# Patient Record
Sex: FEMALE | Race: BLACK OR AFRICAN AMERICAN | NOT HISPANIC OR LATINO | Employment: OTHER | URBAN - METROPOLITAN AREA
[De-identification: names, ages, dates, MRNs, and addresses within clinical notes are randomized per-mention and may not be internally consistent; named-entity substitution may affect disease eponyms.]

---

## 2023-05-22 DIAGNOSIS — E78.00 HIGH CHOLESTEROL: ICD-10-CM

## 2023-05-22 RX ORDER — ATORVASTATIN CALCIUM 40 MG/1
40 TABLET, FILM COATED ORAL NIGHTLY
Qty: 90 TABLET | Refills: 0 | Status: SHIPPED | OUTPATIENT
Start: 2023-05-22 | End: 2023-07-17

## 2023-05-22 RX ORDER — ATORVASTATIN CALCIUM 40 MG/1
40 TABLET, FILM COATED ORAL NIGHTLY
COMMUNITY
Start: 2021-01-08 | End: 2023-05-22 | Stop reason: SDUPTHER

## 2023-07-16 DIAGNOSIS — E78.00 HIGH CHOLESTEROL: ICD-10-CM

## 2023-07-17 RX ORDER — ATORVASTATIN CALCIUM 40 MG/1
TABLET, FILM COATED ORAL
Qty: 90 TABLET | Refills: 3 | Status: SHIPPED | OUTPATIENT
Start: 2023-07-17 | End: 2023-10-09 | Stop reason: SDUPTHER

## 2023-10-03 DIAGNOSIS — G35 MULTIPLE SCLEROSIS (MULTI): Primary | ICD-10-CM

## 2023-10-06 ENCOUNTER — SPECIALTY PHARMACY (OUTPATIENT)
Dept: PHARMACY | Facility: CLINIC | Age: 58
End: 2023-10-06

## 2023-10-06 RX ORDER — SIPONIMOD 2 MG/1
2 TABLET, FILM COATED ORAL DAILY
Qty: 90 TABLET | Refills: 1 | Status: SHIPPED | OUTPATIENT
Start: 2023-10-06 | End: 2024-04-03

## 2023-10-06 RX ORDER — SIPONIMOD 0.25 MG/1
TABLET, FILM COATED ORAL
Qty: 12 TABLET | Refills: 0 | Status: SHIPPED | OUTPATIENT
Start: 2023-10-06

## 2023-10-09 ENCOUNTER — LAB (OUTPATIENT)
Dept: LAB | Facility: LAB | Age: 58
End: 2023-10-09
Payer: COMMERCIAL

## 2023-10-09 ENCOUNTER — HOSPITAL ENCOUNTER (OUTPATIENT)
Dept: RADIOLOGY | Facility: HOSPITAL | Age: 58
Discharge: HOME | End: 2023-10-09
Payer: COMMERCIAL

## 2023-10-09 ENCOUNTER — OFFICE VISIT (OUTPATIENT)
Dept: PRIMARY CARE | Facility: CLINIC | Age: 58
End: 2023-10-09
Payer: COMMERCIAL

## 2023-10-09 VITALS
BODY MASS INDEX: 24.73 KG/M2 | HEART RATE: 68 BPM | DIASTOLIC BLOOD PRESSURE: 86 MMHG | SYSTOLIC BLOOD PRESSURE: 146 MMHG | HEIGHT: 61 IN | OXYGEN SATURATION: 99 % | WEIGHT: 131 LBS | RESPIRATION RATE: 21 BRPM

## 2023-10-09 DIAGNOSIS — I10 HYPERTENSION, UNSPECIFIED TYPE: ICD-10-CM

## 2023-10-09 DIAGNOSIS — G89.29 CHRONIC BILATERAL LOW BACK PAIN WITH BILATERAL SCIATICA: ICD-10-CM

## 2023-10-09 DIAGNOSIS — M79.89 SWELLING OF FINGER: ICD-10-CM

## 2023-10-09 DIAGNOSIS — Z28.21 INFLUENZA VACCINATION DECLINED BY PATIENT: ICD-10-CM

## 2023-10-09 DIAGNOSIS — E78.00 HIGH CHOLESTEROL: ICD-10-CM

## 2023-10-09 DIAGNOSIS — E55.9 VITAMIN D DEFICIENCY: ICD-10-CM

## 2023-10-09 DIAGNOSIS — Z91.81 AT HIGH RISK FOR FALLS: ICD-10-CM

## 2023-10-09 DIAGNOSIS — Z53.20 MAMMOGRAM DECLINED: ICD-10-CM
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
## 2023-10-09 DIAGNOSIS — M79.646 PAIN OF FINGER, UNSPECIFIED LATERALITY: ICD-10-CM

## 2023-10-09 DIAGNOSIS — M54.41 CHRONIC BILATERAL LOW BACK PAIN WITH BILATERAL SCIATICA: ICD-10-CM

## 2023-10-09 DIAGNOSIS — Z00.00 ROUTINE GENERAL MEDICAL EXAMINATION AT A HEALTH CARE FACILITY: Primary | ICD-10-CM

## 2023-10-09 DIAGNOSIS — M19.90 ARTHRITIS: ICD-10-CM

## 2023-10-09 DIAGNOSIS — E53.8 LOW SERUM VITAMIN B12: ICD-10-CM

## 2023-10-09 DIAGNOSIS — E78.2 MIXED HYPERLIPIDEMIA: ICD-10-CM

## 2023-10-09 DIAGNOSIS — R23.8 DRY SCALP: ICD-10-CM

## 2023-10-09 DIAGNOSIS — M54.42 CHRONIC BILATERAL LOW BACK PAIN WITH BILATERAL SCIATICA: ICD-10-CM

## 2023-10-09 DIAGNOSIS — G35 MULTIPLE SCLEROSIS (MULTI): ICD-10-CM
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
## 2023-10-09 PROBLEM — M54.9 BACK PAIN: Status: ACTIVE | Noted: 2023-10-09

## 2023-10-09 PROBLEM — M25.511 CHRONIC PAIN OF BOTH SHOULDERS: Status: ACTIVE | Noted: 2017-11-14

## 2023-10-09 PROBLEM — M25.512 CHRONIC PAIN OF BOTH SHOULDERS: Status: ACTIVE | Noted: 2017-11-14

## 2023-10-09 PROBLEM — M54.50 ACUTE BILATERAL LOW BACK PAIN WITHOUT SCIATICA: Status: ACTIVE | Noted: 2017-05-15

## 2023-10-09 PROBLEM — R26.89 IMPAIRED GAIT AND MOBILITY: Status: ACTIVE | Noted: 2023-10-09

## 2023-10-09 PROBLEM — E78.5 HYPERLIPIDEMIA: Status: ACTIVE | Noted: 2023-10-09

## 2023-10-09 PROBLEM — C20 RECTAL CANCER (MULTI): Status: ACTIVE | Noted: 2018-05-30

## 2023-10-09 PROBLEM — R79.89 LOW VITAMIN D LEVEL: Status: ACTIVE | Noted: 2023-10-09

## 2023-10-09 PROBLEM — R25.2 SPASTICITY: Status: ACTIVE | Noted: 2023-10-09

## 2023-10-09 PROBLEM — G82.20 PARAPARESIS (MULTI): Status: ACTIVE | Noted: 2023-10-09

## 2023-10-09 PROBLEM — M54.40 CHRONIC BILATERAL LOW BACK PAIN WITH SCIATICA: Status: ACTIVE | Noted: 2017-11-14

## 2023-10-09 PROBLEM — M54.2 NECK PAIN, CHRONIC: Status: ACTIVE | Noted: 2017-11-14

## 2023-10-09 LAB — VIT B12 SERPL-MCNC: 561 PG/ML (ref 211–911)

## 2023-10-09 PROCEDURE — 3079F DIAST BP 80-89 MM HG: CPT

## 2023-10-09 PROCEDURE — 3077F SYST BP >= 140 MM HG: CPT

## 2023-10-09 PROCEDURE — 73130 X-RAY EXAM OF HAND: CPT | Mod: 50

## 2023-10-09 PROCEDURE — 73130 X-RAY EXAM OF HAND: CPT | Mod: BILATERAL PROCEDURE | Performed by: RADIOLOGY

## 2023-10-09 PROCEDURE — 82607 VITAMIN B-12: CPT

## 2023-10-09 PROCEDURE — 99214 OFFICE O/P EST MOD 30 MIN: CPT

## 2023-10-09 PROCEDURE — 36415 COLL VENOUS BLD VENIPUNCTURE: CPT

## 2023-10-09 PROCEDURE — 1036F TOBACCO NON-USER: CPT

## 2023-10-09 RX ORDER — ATORVASTATIN CALCIUM 40 MG/1
40 TABLET, FILM COATED ORAL NIGHTLY
Qty: 90 TABLET | Refills: 3 | Status: SHIPPED | OUTPATIENT
Start: 2023-10-09 | End: 2024-02-05 | Stop reason: SDUPTHER

## 2023-10-09 RX ORDER — AMANTADINE HYDROCHLORIDE 100 MG/1
100 TABLET ORAL DAILY
Qty: 30 TABLET | Refills: 0 | OUTPATIENT
Start: 2023-10-09 | End: 2023-11-08

## 2023-10-09 RX ORDER — KETOCONAZOLE 20 MG/ML
SHAMPOO, SUSPENSION TOPICAL 2 TIMES WEEKLY
Qty: 120 ML | Refills: 0 | Status: SHIPPED | OUTPATIENT
Start: 2023-10-09 | End: 2024-01-12

## 2023-10-09 RX ORDER — ERGOCALCIFEROL 1.25 MG/1
50000 CAPSULE ORAL
Qty: 12 CAPSULE | Refills: 0 | Status: SHIPPED | OUTPATIENT
Start: 2023-10-09 | End: 2023-12-12

## 2023-10-09 RX ORDER — AMLODIPINE BESYLATE 5 MG/1
5 TABLET ORAL DAILY
COMMUNITY
Start: 2019-03-27 | End: 2023-10-09 | Stop reason: SDUPTHER

## 2023-10-09 RX ORDER — ALBUTEROL SULFATE 90 UG/1
2 AEROSOL, METERED RESPIRATORY (INHALATION) EVERY 4 HOURS PRN
COMMUNITY
Start: 2018-08-30 | End: 2023-10-09 | Stop reason: SDUPTHER

## 2023-10-09 RX ORDER — MULTIVITAMIN
1 TABLET ORAL
COMMUNITY

## 2023-10-09 RX ORDER — AMLODIPINE BESYLATE 2.5 MG/1
2.5 TABLET ORAL DAILY
Qty: 90 TABLET | Refills: 3 | Status: SHIPPED | OUTPATIENT
Start: 2023-10-09 | End: 2024-01-18 | Stop reason: SDUPTHER

## 2023-10-09 RX ORDER — ALBUTEROL SULFATE 90 UG/1
2 AEROSOL, METERED RESPIRATORY (INHALATION) EVERY 4 HOURS PRN
Qty: 18 G | Refills: 2 | Status: SHIPPED | OUTPATIENT
Start: 2023-10-09 | End: 2023-12-22

## 2023-10-09 RX ORDER — ACETAMINOPHEN 325 MG/1
650 TABLET ORAL EVERY 6 HOURS PRN
COMMUNITY

## 2023-10-09 RX ORDER — AMANTADINE HYDROCHLORIDE 100 MG/1
TABLET ORAL
COMMUNITY
Start: 2020-01-30 | End: 2023-10-09 | Stop reason: SDUPTHER

## 2023-10-09 RX ORDER — AMANTADINE HYDROCHLORIDE 100 MG/1
100 TABLET ORAL DAILY
Qty: 30 TABLET | Refills: 0 | Status: SHIPPED | OUTPATIENT
Start: 2023-10-09 | End: 2023-10-09 | Stop reason: ENTERED-IN-ERROR

## 2023-10-09 RX ORDER — BACLOFEN 10 MG/1
10 TABLET ORAL 3 TIMES DAILY
Qty: 90 TABLET | Refills: 3 | Status: SHIPPED | OUTPATIENT
Start: 2023-10-09

## 2023-10-09 RX ORDER — BACLOFEN 10 MG/1
10 TABLET ORAL 3 TIMES DAILY
COMMUNITY
Start: 2019-03-19 | End: 2023-10-09 | Stop reason: SDUPTHER

## 2023-10-09 ASSESSMENT — ENCOUNTER SYMPTOMS
BLOOD IN STOOL: 0
APNEA: 0
CARDIOVASCULAR NEGATIVE: 1
LIGHT-HEADEDNESS: 0
RESPIRATORY NEGATIVE: 1
DIAPHORESIS: 0
DIARRHEA: 0
VOMITING: 0
HEADACHES: 0
GASTROINTESTINAL NEGATIVE: 1
RHINORRHEA: 0
AGITATION: 0
DYSPHORIC MOOD: 0
OCCASIONAL FEELINGS OF UNSTEADINESS: 0
BACK PAIN: 1
CHILLS: 0
LOSS OF SENSATION IN FEET: 0
COLOR CHANGE: 0
EYES NEGATIVE: 1
ARTHRALGIAS: 1
CHEST TIGHTNESS: 0
ACTIVITY CHANGE: 0
NERVOUS/ANXIOUS: 0
FREQUENCY: 0
SLEEP DISTURBANCE: 0
RECTAL PAIN: 0
HYPERACTIVE: 0
POLYPHAGIA: 0
SPEECH DIFFICULTY: 0
PALPITATIONS: 0
DIFFICULTY URINATING: 0
FEVER: 0
SORE THROAT: 0
DYSURIA: 0
SINUS PRESSURE: 0
NECK STIFFNESS: 0
PSYCHIATRIC NEGATIVE: 1
BRUISES/BLEEDS EASILY: 0
PHOTOPHOBIA: 0
STRIDOR: 0
UNEXPECTED WEIGHT CHANGE: 0
APPETITE CHANGE: 0
HEMATURIA: 0
FLANK PAIN: 0
CONSTIPATION: 0
HALLUCINATIONS: 0
ABDOMINAL PAIN: 0
CONFUSION: 0
NAUSEA: 0
ROS SKIN COMMENTS: DRY SCALP
SEIZURES: 0
SHORTNESS OF BREATH: 0
TREMORS: 0
FATIGUE: 0
MYALGIAS: 1
COUGH: 0
WHEEZING: 0
VOICE CHANGE: 0
ABDOMINAL DISTENTION: 0
ANAL BLEEDING: 0
NECK PAIN: 0
EYE DISCHARGE: 0
ENDOCRINE NEGATIVE: 1
DEPRESSION: 0
TROUBLE SWALLOWING: 0
JOINT SWELLING: 1
HEMATOLOGIC/LYMPHATIC NEGATIVE: 1
CONSTITUTIONAL NEGATIVE: 1
DIZZINESS: 0
POLYDIPSIA: 0

## 2023-10-09 ASSESSMENT — PATIENT HEALTH QUESTIONNAIRE - PHQ9
SUM OF ALL RESPONSES TO PHQ9 QUESTIONS 1 AND 2: 0
1. LITTLE INTEREST OR PLEASURE IN DOING THINGS: NOT AT ALL
2. FEELING DOWN, DEPRESSED OR HOPELESS: NOT AT ALL

## 2023-10-09 ASSESSMENT — PAIN SCALES - GENERAL: PAINLEVEL: 10-WORST PAIN EVER

## 2023-10-09 NOTE — TELEPHONE ENCOUNTER
Filled the albuterol. Click on the amantadine (Symmetrel) 100 mg tablet - may have gone through to pharmacy, however I discontinued this because this medication needs to come from her Neurologist if she is using it for the treatment of her MS. Again, did not mean to RF so I cancelled it to the pharmacy. Her MS treatment again needs to come from her Neurologist. If she is to continue this she needs to call their office for a refill of it.

## 2023-10-09 NOTE — PROGRESS NOTES
Reason for Visit: Annual Physical Exam    HPI:  Needs medication refill    Fall    MS- worsening slightly, saw neurology, started on new medication    s/p fall; was helping move a couch and the couch fell on her fingers and left toes  left finger- pain and swelling at 4th and 5th digit  pain and swelling at 1st, 2nd, and 5th digit  Left greater toe and second toe with some swelling; ROM good- this has improved  Happened 1 week ago    Arthritis pain    Chronic lumbar pain with BL sciatica     Covid vaccine due    Declines mammogram    Rectal cancer; due to see oncologist; patient scheduled appointment with oncology while IO today    Vitamin B 12 def    Vitamin d def    No chest pain, no shortness of breath  BM regular  No issues with urination  Energy level is good      Active Problem List  Patient Active Problem List   Diagnosis    Anemia    Arthritis    Asthma    Back pain    Acute bilateral low back pain without sciatica    Chronic bilateral low back pain with sciatica    Neck pain, chronic    Chronic pain of both shoulders    HTN (hypertension)    High cholesterol    Impaired gait and mobility    Irregular menstrual cycle    Low serum vitamin B12    Low vitamin D level    Malignant neoplasm of ascending colon (CMS/HCC)    Malignant neoplasm of colon (CMS/HCC)    Rectal cancer (CMS/HCC)    Multiple sclerosis (CMS/HCC)    Myopia    Paraparesis (CMS/HCC)    Presbyopia    S/P colectomy    Spasticity    Vitamin D deficiency    Weakness    Mammogram declined    At high risk for falls    Swelling of finger    Pain of finger    Fall    Dry scalp    Influenza vaccination declined by patient       Comprehensive Medical/Surgical/Social/Family History  Past Medical History:   Diagnosis Date    Constipation 12/06/2012    Contusion of left shoulder, initial encounter 02/08/2019    Contusion of left shoulder, initial encounter    Dizziness 03/15/2016    Encounter for blood-alcohol and blood-drug test 03/21/2019    Encounter for  drug screening    Encounter for general adult medical examination without abnormal findings 08/03/2021    Medicare annual wellness visit, subsequent    Inconclusive mammogram 03/27/2019    Dense breast tissue    Low back pain, unspecified     Acute bilateral low back pain without sciatica    Lumbago with sciatica, unspecified side 04/06/2020    Chronic bilateral low back pain with sciatica    Nausea 03/15/2016    Pain in left shoulder 10/07/2019    Left shoulder pain    Pain in right shoulder 11/22/2019    Chronic pain of both shoulders    Pain in right shoulder 05/31/2019    Shoulder pain, bilateral    Personal history of cervical dysplasia 03/27/2019    History of cervical dysplasia    Personal history of diseases of the blood and blood-forming organs and certain disorders involving the immune mechanism     History of anemia    Personal history of other diseases of the digestive system     H/O constipation    Personal history of other diseases of the musculoskeletal system and connective tissue 11/22/2019    History of low back pain    Personal history of other diseases of the musculoskeletal system and connective tissue 11/14/2019    History of neck pain    Personal history of other diseases of the musculoskeletal system and connective tissue 11/22/2019    History of neck pain    Personal history of other diseases of the nervous system and sense organs     History of myopia    Personal history of other endocrine, nutritional and metabolic disease 07/14/2021    History of elevated lipids    Personal history of other endocrine, nutritional and metabolic disease 08/19/2020    History of vitamin D deficiency    Personal history of other infectious and parasitic diseases     History of scarlet fever    Personal history of other infectious and parasitic diseases 04/06/2020    History of tinea capitis    Personal history of other infectious and parasitic diseases 04/15/2020    History of athlete's foot    Personal  history of other malignant neoplasm of large intestine     History of malignant neoplasm of colon    Personal history of other mental and behavioral disorders 01/08/2021    History of depression    Personal history of other specified conditions     History of abdominal pain    Personal history of other specified conditions     History of dizziness    Personal history of other specified conditions     H/O nausea    Personal history of other specified conditions 02/26/2020    History of abnormal mammogram    Reaction to severe stress, unspecified 09/26/2019    Situational stress    Seborrhea capitis 08/19/2020    Dandruff, adult    Unspecified asthma with (acute) exacerbation 04/06/2020    Acute asthma exacerbation    Unspecified injury of left foot, initial encounter 04/15/2020    Foot injury, left, initial encounter     Past Surgical History:   Procedure Laterality Date    OTHER SURGICAL HISTORY  02/08/2019    Colectomy    OTHER SURGICAL HISTORY  02/08/2019    Colonoscopy    OTHER SURGICAL HISTORY  02/08/2019    Hysterectomy     Social History     Social History Narrative    Not on file         Allergies and Medications  Aloe vera; Metronidazole; Sulfa (sulfonamide antibiotics); Tetracycline; Aspirin,buffd-calcium carb-mag; Docusate sodium; Influenza virus vaccines; Oseltamivir; Statins-hmg-coa reductase inhibitors; Sulfamethoxazole-trimethoprim; and Soap  Current Outpatient Medications on File Prior to Visit   Medication Sig Dispense Refill    albuterol 90 mcg/actuation inhaler Inhale 2 puffs every 4 hours if needed for wheezing.      amantadine (Symmetrel) 100 mg tablet Take by mouth.      sodium chloride (Ocean) 0.65 % nasal spray Administer 2 sprays into affected nostril(s).      [DISCONTINUED] amLODIPine (Norvasc) 5 mg tablet Take 1 tablet (5 mg) by mouth once daily.      [DISCONTINUED] baclofen (Lioresal) 10 mg tablet Take 1 tablet (10 mg) by mouth 3 times a day.      [DISCONTINUED] diclofenac sodium 1 % kit  Apply 1 Dose topically once daily.      acetaminophen (Tylenol) 325 mg tablet Take 2 tablets (650 mg) by mouth every 6 hours if needed for moderate pain (4 - 6).      Mayzent Starter,for 2mg maint, 0.25 mg (12 tabs) tablets,dose pack tablet dose pack TAKE BY MOUTH AS DIRECTED PER  PACKAGE THEN AS DIRECTED  THEREAFTER 12 tablet 0    multivitamin tablet Take 1 tablet by mouth once daily.      siponimod (Mayzent) 2 mg tablet tablet Take 1 tablet (2 mg) by mouth once daily. 90 tablet 1    [DISCONTINUED] atorvastatin (Lipitor) 40 mg tablet TAKE 1 TABLET BY MOUTH ONCE  DAILY AT BEDTIME 90 tablet 3     No current facility-administered medications on file prior to visit.         ROS otherwise negative aside from what was mentioned above in HPI.  Review of Systems   Constitutional: Negative.  Negative for activity change, appetite change, chills, diaphoresis, fatigue, fever and unexpected weight change.   HENT: Negative.  Negative for congestion, dental problem, ear discharge, ear pain, hearing loss, mouth sores, nosebleeds, postnasal drip, rhinorrhea, sinus pressure, sneezing, sore throat, tinnitus, trouble swallowing and voice change.    Eyes: Negative.  Negative for photophobia, discharge and visual disturbance.   Respiratory: Negative.  Negative for apnea, cough, chest tightness, shortness of breath, wheezing and stridor.    Cardiovascular: Negative.  Negative for chest pain, palpitations and leg swelling.   Gastrointestinal: Negative.  Negative for abdominal distention, abdominal pain, anal bleeding, blood in stool, constipation, diarrhea, nausea, rectal pain and vomiting.   Endocrine: Negative.  Negative for cold intolerance, heat intolerance, polydipsia, polyphagia and polyuria.   Genitourinary: Negative.  Negative for decreased urine volume, difficulty urinating, dysuria, flank pain, frequency, hematuria and urgency.   Musculoskeletal:  Positive for arthralgias, back pain, gait problem, joint swelling and myalgias.  "Negative for neck pain and neck stiffness.   Skin:  Negative for color change and rash.        Dry scalp   Neurological:  Negative for dizziness, tremors, seizures, syncope, speech difficulty, light-headedness and headaches.   Hematological: Negative.  Does not bruise/bleed easily.   Psychiatric/Behavioral: Negative.  Negative for agitation, confusion, dysphoric mood, hallucinations, self-injury, sleep disturbance and suicidal ideas. The patient is not nervous/anxious and is not hyperactive.    All other systems reviewed and are negative.        Vitals  /86   Pulse 68   Resp 21   Ht 1.537 m (5' 0.5\")   Wt 59.4 kg (131 lb)   SpO2 99%   BMI 25.16 kg/m²   Body mass index is 25.16 kg/m².    Physical Exam  Physical Exam  Vitals reviewed.   Constitutional:       General: She is not in acute distress.     Appearance: Normal appearance. She is normal weight. She is not ill-appearing, toxic-appearing or diaphoretic.   HENT:      Head: Normocephalic and atraumatic.      Nose: Nose normal.   Eyes:      Extraocular Movements: Extraocular movements intact.      Conjunctiva/sclera: Conjunctivae normal.      Pupils: Pupils are equal, round, and reactive to light.   Cardiovascular:      Rate and Rhythm: Normal rate and regular rhythm.      Pulses: Normal pulses.      Heart sounds: Normal heart sounds. No murmur heard.     No friction rub. No gallop.   Pulmonary:      Effort: Pulmonary effort is normal. No respiratory distress.      Breath sounds: Normal breath sounds.   Abdominal:      General: Abdomen is flat. Bowel sounds are normal.      Palpations: Abdomen is soft.   Musculoskeletal:         General: Tenderness (BL hand and fingers x 5) present. Normal range of motion.      Cervical back: Normal range of motion and neck supple.   Skin:     General: Skin is warm and dry.      Capillary Refill: Capillary refill takes less than 2 seconds.   Neurological:      General: No focal deficit present.      Mental Status: She " is alert and oriented to person, place, and time. Mental status is at baseline.   Psychiatric:         Mood and Affect: Mood normal.         Behavior: Behavior normal.         Thought Content: Thought content normal.         Judgment: Judgment normal.           Assessment and Plan:  Problem List Items Addressed This Visit       Arthritis    Relevant Medications    diclofenac sodium 1 % kit    Other Relevant Orders    Referral to Pain Medicine    Chronic bilateral low back pain with sciatica    Relevant Medications    diclofenac sodium 1 % kit    baclofen (Lioresal) 10 mg tablet    Other Relevant Orders    Referral to Physical Therapy    Referral to Pain Medicine    HTN (hypertension)    Relevant Medications    Uncontrolled; pt stopped taking medication  amLODIPine (Norvasc) 2.5 mg tablet    High cholesterol    Relevant Medications    atorvastatin (Lipitor) 40 mg tablet    Low serum vitamin B12    Relevant Orders    Vitamin B12    Multiple sclerosis (CMS/HCC)    Relevant Orders    Referral to Physical Therapy    Vitamin D deficiency    Relevant Medications    ergocalciferol (Vitamin D-2) 1.25 MG (79918 UT) capsule    Mammogram declined    At high risk for falls    Relevant Orders    Referral to Physical Therapy    Swelling of finger    Relevant Orders    XR hand left 3+ views    XR hand right 3+ views    Pain of finger    Relevant Orders    XR hand left 3+ views    XR hand right 3+ views    Fall - Primary    Relevant Orders    XR hand left 3+ views    XR hand right 3+ views    Referral to Physical Therapy    Dry scalp    Relevant Medications    ketoconazole (NIZOral) 2 % shampoo    Influenza vaccination declined by patient       S/p with injury to fingers and toes; XR BL hand  High falls risk  PT ordered    Rectal cancer; due to see oncologist; patient scheduled appointment with oncology while IO today; 11/28/23    Declines mammogram- states she will not get this done    Follow up in 3 months or sooner if needed

## 2023-10-10 ENCOUNTER — TELEPHONE (OUTPATIENT)
Dept: PRIMARY CARE | Facility: CLINIC | Age: 58
End: 2023-10-10
Payer: COMMERCIAL

## 2023-10-10 NOTE — TELEPHONE ENCOUNTER
Per SILVIA Simpson... LVM..Filled the albuterol. Click on the amantadine (Symmetrel) 100 mg tablet - may have gone through to pharmacy, by accident, however I discontinued this because this medication needs to come from her Neurologist if she is using it for the treatment of her MS. Again, did not mean to RF so I cancelled it to the pharmacy. Her MS treatment again needs to come from her Neurologist. If she is to continue this she needs to call their office for a refill of it.

## 2023-10-16 ENCOUNTER — TELEPHONE (OUTPATIENT)
Dept: PRIMARY CARE | Facility: CLINIC | Age: 58
End: 2023-10-16
Payer: COMMERCIAL

## 2023-10-16 DIAGNOSIS — G47.9 SLEEP DIFFICULTIES: Primary | ICD-10-CM

## 2023-10-16 RX ORDER — AMANTADINE HYDROCHLORIDE 100 MG/1
100 CAPSULE, GELATIN COATED ORAL 2 TIMES DAILY
Qty: 60 CAPSULE | Refills: 11 | Status: CANCELLED | OUTPATIENT
Start: 2023-10-16 | End: 2024-10-15

## 2023-10-16 NOTE — TELEPHONE ENCOUNTER
Per SILVIA Simpson... Informed Patient Normal xray of her hands. Continue to ice as needed. Pt also stated she needed a steroid and also wanted Amantadine refilled for sleep. Also she stated if she could have a steroid?

## 2023-10-20 ENCOUNTER — APPOINTMENT (OUTPATIENT)
Dept: PRIMARY CARE | Facility: CLINIC | Age: 58
End: 2023-10-20
Payer: COMMERCIAL

## 2023-12-11 DIAGNOSIS — E55.9 VITAMIN D DEFICIENCY: ICD-10-CM

## 2023-12-12 RX ORDER — ERGOCALCIFEROL 1.25 1/1
1 CAPSULE ORAL
Qty: 12 CAPSULE | Refills: 3 | Status: SHIPPED | OUTPATIENT
Start: 2023-12-12 | End: 2024-01-17 | Stop reason: SDUPTHER

## 2023-12-19 DIAGNOSIS — Z00.00 ROUTINE GENERAL MEDICAL EXAMINATION AT A HEALTH CARE FACILITY: ICD-10-CM

## 2023-12-22 RX ORDER — ALBUTEROL SULFATE 90 UG/1
2 AEROSOL, METERED RESPIRATORY (INHALATION) EVERY 4 HOURS PRN
Qty: 51 G | Refills: 2 | Status: SHIPPED | OUTPATIENT
Start: 2023-12-22

## 2024-01-09 DIAGNOSIS — R23.8 DRY SCALP: ICD-10-CM

## 2024-01-12 RX ORDER — KETOCONAZOLE 20 MG/ML
SHAMPOO, SUSPENSION TOPICAL
Qty: 120 ML | Refills: 0 | Status: SHIPPED | OUTPATIENT
Start: 2024-01-12

## 2024-01-17 DIAGNOSIS — E55.9 VITAMIN D DEFICIENCY: ICD-10-CM

## 2024-01-17 DIAGNOSIS — I10 HYPERTENSION, UNSPECIFIED TYPE: ICD-10-CM

## 2024-01-17 NOTE — TELEPHONE ENCOUNTER
Patient is calling to get a refill on her amlodipine I asked the patient how many mg's she said 5mg but in her chart she it says 2.5mg she said you increased because her blood pressure was elevated and also she said she need a letter stating her diagnosis to send to her  to renew her medicaid

## 2024-01-18 RX ORDER — ERGOCALCIFEROL 1.25 MG/1
1 CAPSULE ORAL
Qty: 12 CAPSULE | Refills: 3 | Status: SHIPPED | OUTPATIENT
Start: 2024-01-18 | End: 2024-02-05 | Stop reason: SDUPTHER

## 2024-01-18 RX ORDER — AMLODIPINE BESYLATE 2.5 MG/1
2.5 TABLET ORAL DAILY
Qty: 90 TABLET | Refills: 3 | Status: SHIPPED | OUTPATIENT
Start: 2024-01-18 | End: 2024-02-05 | Stop reason: SDUPTHER

## 2024-01-18 NOTE — TELEPHONE ENCOUNTER
Per SILVIA Simpson... Informed Patient she had stopped taking her medication when we saw her in Oct. LIU Sherwood started her on amlodipine 2.5mg..... Called to check how much she is currently taking and her home BP's. It also sounds like she might need a follow up apt. Informed her to call [phone] to schedule an appointment. Also need to clarify exactly what she would like for LIU Sherwood to put in the letter for her.      aerobic capacity/endurance/gait, locomotion, and balance

## 2024-01-18 NOTE — TELEPHONE ENCOUNTER
I refilled amlodipine 2.5mg. I called patient- no answer- LVM asking to to call me back to let me know what dose of amlodipine she is taking and what she would like me to put in the letter.

## 2024-01-25 ENCOUNTER — OFFICE VISIT (OUTPATIENT)
Dept: NEUROLOGY | Facility: CLINIC | Age: 59
End: 2024-01-25
Payer: COMMERCIAL

## 2024-01-25 VITALS
DIASTOLIC BLOOD PRESSURE: 69 MMHG | TEMPERATURE: 97.3 F | HEART RATE: 69 BPM | SYSTOLIC BLOOD PRESSURE: 143 MMHG | HEIGHT: 61 IN | BODY MASS INDEX: 27.75 KG/M2 | WEIGHT: 147 LBS

## 2024-01-25 DIAGNOSIS — G35 MULTIPLE SCLEROSIS (MULTI): Primary | ICD-10-CM

## 2024-01-25 PROCEDURE — 3077F SYST BP >= 140 MM HG: CPT | Performed by: PSYCHIATRY & NEUROLOGY

## 2024-01-25 PROCEDURE — 3078F DIAST BP <80 MM HG: CPT | Performed by: PSYCHIATRY & NEUROLOGY

## 2024-01-25 PROCEDURE — 99213 OFFICE O/P EST LOW 20 MIN: CPT | Performed by: PSYCHIATRY & NEUROLOGY

## 2024-01-25 PROCEDURE — 1036F TOBACCO NON-USER: CPT | Performed by: PSYCHIATRY & NEUROLOGY

## 2024-01-25 NOTE — LETTER
January 25, 2024     RITA Sherwood-CNP  [address] [place], [facility] [address]  [facility] [address]    Patient: Roberta Dewey   YOB: 1965   Date of Visit: 1/25/2024       Dear RITA Mckeon-CNP:    Thank you for allowing me to continue in the neurological care of your patient, Roberta Dewey. Below are my notes for this visit.  If you have questions, please do not hesitate to call me. I look forward to following your patient along with you.       Sincerely,     Shimon Page Jr., M.D., FAAN       CC: No Recipients  ______________________________________________________________________________________    NEUROLOGY OUTPATIENT FOLLOW-UP NOTE    Assessment/Plan  Diagnoses and all orders for this visit:  Multiple sclerosis (CMS/[facility])      IMPRESSION:  Multiple sclerosis.  Bilateral calf spasms.    PLAN:  To continue Mayzent 2 mg daily for MS prophylaxis.  I advised her to increase baclofen to 20 mg tid for the spasms.  To continue amantadine for fatigue.  I will  see her again in 3-4 months or prn.      Shimon Page Jr., M.D., FAAN   ----------    Subjective    Roberta Dewey is a 58 y.o. year old female here for follow-up.    Has spasms worsened in the last two months in that she has spasms of the calves and the feet, though the thighs can be involved.  She denies a specific movement trigger for the spasms.  She also has tingling of the plantar feet.    Insomnia with difficulty in sleep onset, but not continuation.    She denies new focal neurological symptoms including dysarthria, dysphagia, diplopia, focal weakness, focal sensory change, ataxia, vertigo, or bowel/bladder incontinence, among others.    Patient Active Problem List   Diagnosis   • Anemia   • Arthritis   • Asthma   • Back pain   • Acute bilateral low back pain without sciatica   • Chronic bilateral low back pain with sciatica   • Neck pain, chronic   • Chronic pain of both shoulders   • HTN  (hypertension)   • High cholesterol   • Impaired gait and mobility   • Irregular menstrual cycle   • Low serum vitamin B12   • Low vitamin D level   • Malignant neoplasm of ascending colon (CMS/HCC)   • Malignant neoplasm of colon (CMS/HCC)   • Rectal cancer (CMS/HCC)   • Multiple sclerosis (CMS/HCC)   • Myopia   • Paraparesis (CMS/HCC)   • Presbyopia   • S/P colectomy   • Spasticity   • Vitamin D deficiency   • Weakness   • Mammogram declined   • At high risk for falls   • Swelling of finger   • Pain of finger   • Fall   • Dry scalp   • Influenza vaccination declined by patient     Past Medical History:   Diagnosis Date   • Constipation 12/06/2012   • Contusion of left shoulder, initial encounter 02/08/2019    Contusion of left shoulder, initial encounter   • Dizziness 03/15/2016   • Encounter for blood-alcohol and blood-drug test 03/21/2019    Encounter for drug screening   • Encounter for general adult medical examination without abnormal findings 08/03/2021    Medicare annual wellness visit, subsequent   • Inconclusive mammogram 03/27/2019    Dense breast tissue   • Low back pain, unspecified     Acute bilateral low back pain without sciatica   • Lumbago with sciatica, unspecified side 04/06/2020    Chronic bilateral low back pain with sciatica   • Nausea 03/15/2016   • Pain in left shoulder 10/07/2019    Left shoulder pain   • Pain in right shoulder 11/22/2019    Chronic pain of both shoulders   • Pain in right shoulder 05/31/2019    Shoulder pain, bilateral   • Personal history of cervical dysplasia 03/27/2019    History of cervical dysplasia   • Personal history of diseases of the blood and blood-forming organs and certain disorders involving the immune mechanism     History of anemia   • Personal history of other diseases of the digestive system     H/O constipation   • Personal history of other diseases of the musculoskeletal system and connective tissue 11/22/2019    History of low back pain   • Personal  history of other diseases of the musculoskeletal system and connective tissue 11/14/2019    History of neck pain   • Personal history of other diseases of the musculoskeletal system and connective tissue 11/22/2019    History of neck pain   • Personal history of other diseases of the nervous system and sense organs     History of myopia   • Personal history of other endocrine, nutritional and metabolic disease 07/14/2021    History of elevated lipids   • Personal history of other endocrine, nutritional and metabolic disease 08/19/2020    History of vitamin D deficiency   • Personal history of other infectious and parasitic diseases     History of scarlet fever   • Personal history of other infectious and parasitic diseases 04/06/2020    History of tinea capitis   • Personal history of other infectious and parasitic diseases 04/15/2020    History of athlete's foot   • Personal history of other malignant neoplasm of large intestine     History of malignant neoplasm of colon   • Personal history of other mental and behavioral disorders 01/08/2021    History of depression   • Personal history of other specified conditions     History of abdominal pain   • Personal history of other specified conditions     History of dizziness   • Personal history of other specified conditions     H/O nausea   • Personal history of other specified conditions 02/26/2020    History of abnormal mammogram   • Reaction to severe stress, unspecified 09/26/2019    Situational stress   • Seborrhea capitis 08/19/2020    Dandruff, adult   • Unspecified asthma with (acute) exacerbation 04/06/2020    Acute asthma exacerbation   • Unspecified injury of left foot, initial encounter 04/15/2020    Foot injury, left, initial encounter     Past Surgical History:   Procedure Laterality Date   • OTHER SURGICAL HISTORY  02/08/2019    Colectomy   • OTHER SURGICAL HISTORY  02/08/2019    Colonoscopy   • OTHER SURGICAL HISTORY  02/08/2019    Hysterectomy      Social History     Tobacco Use   • Smoking status: Never   • Smokeless tobacco: Never   Substance Use Topics   • Alcohol use: Never     family history is not on file.    Current Outpatient Medications:   •  acetaminophen (Tylenol) 325 mg tablet, Take 2 tablets (650 mg) by mouth every 6 hours if needed for moderate pain (4 - 6)., Disp: , Rfl:   •  albuterol 90 mcg/actuation inhaler, INHALE 2 INHALATIONS BY MOUTH  EVERY 4 HOURS IF NEEDED FOR  WHEEZING, Disp: 51 g, Rfl: 2  •  amLODIPine (Norvasc) 2.5 mg tablet, Take 1 tablet (2.5 mg) by mouth once daily., Disp: 90 tablet, Rfl: 3  •  atorvastatin (Lipitor) 40 mg tablet, Take 1 tablet (40 mg) by mouth once daily at bedtime., Disp: 90 tablet, Rfl: 3  •  baclofen (Lioresal) 10 mg tablet, Take 1 tablet (10 mg) by mouth 3 times a day., Disp: 90 tablet, Rfl: 3  •  ergocalciferol (Vitamin D2) 1.25 MG (03261 UT) capsule, Take 1 capsule (1,250 mcg) by mouth 1 (one) time per week., Disp: 12 capsule, Rfl: 3  •  ketoconazole (NIZOral) 2 % shampoo, APPLY 5 TO 10 ML TOPICALLY AND  SHAMPOO ONCE WEEKLY. LEAVE ON  FOR 5 TO 10 MINUTES THEN RINSE, Disp: 120 mL, Rfl: 0  •  Mayzent Starter,for 2mg maint, 0.25 mg (12 tabs) tablets,dose pack tablet dose pack, TAKE BY MOUTH AS DIRECTED PER  PACKAGE THEN AS DIRECTED  THEREAFTER (Patient not taking: Reported on 1/25/2024), Disp: 12 tablet, Rfl: 0  •  multivitamin tablet, Take 1 tablet by mouth once daily., Disp: , Rfl:   •  siponimod (Mayzent) 2 mg tablet tablet, Take 1 tablet (2 mg) by mouth once daily. (Patient not taking: Reported on 1/25/2024), Disp: 90 tablet, Rfl: 1  •  sodium chloride (Ocean) 0.65 % nasal spray, Administer 2 sprays into affected nostril(s)., Disp: , Rfl:   Allergies   Allergen Reactions   • Aloe Vera Rash   • Metronidazole Diarrhea   • Sulfa (Sulfonamide Antibiotics) Rash     Throat swells   • Tetracycline Diarrhea   • Aspirin,Buffd-Calcium Carb-Mag Other   • Docusate Sodium Other   • Influenza Virus Vaccines Unknown  "    Gets very sick afterwards   • Oseltamivir Other   • Statins-Hmg-Coa Reductase Inhibitors Unknown   • Sulfamethoxazole-Trimethoprim Other   • Soap Rash     Finnish spring soap       Objective    /69   Pulse 69   Temp 36.3 °C (97.3 °F)   Ht 1.549 m (5' 1\")   Wt 66.7 kg (147 lb)   BMI 27.78 kg/m²     CONSTITUTIONAL:  No acute distress    MENTAL STATUS:  Awake, alert, fully oriented to self, place, and time, with present short-term memory, good awareness of recent events, normal attention span, concentration, and fund of knowledge.    SPEECH AND LANGUAGE:  Can name and repeat, follows all commands, has no dysarthria    CRANIAL NERVES:  II-Vision present, visual fields full to confrontational testing    III/IV/VI--EOMs are present in all directions.  Pupils are symmetrically reactive in dim light.  No ptosis.    V--Normal facial sensation.    VII--No facial asymmetry.    VIII--Hearing present to voice bilaterally.    IX/X--Symmetric soft palate rise.    XI--Normal trapezius power bilaterally.    XII--Tongue protrudes without deviation.    MOTOR:  Normal power, tone, and bulk in both arms and both legs.  Hypertonia in both legs, more the right.     SENSORY:  Spinal sensory level at C7. Otherwise normal pin sensation throughout, without distal-proximal gradient or asymmetry. Proprioception is normal.     COORDINATION:  Normal finger-to-nose and heel-to-shin testing in both arms and both legs.    REFLEXES are normal and symmetric at the biceps, triceps, brachioradialis, patella, and ankle.  The plantar responses are flexor.    GAIT is spastic but steadier with a single-post cane.       Shimon Page Jr., M.D., FAAN     "

## 2024-01-25 NOTE — PATIENT INSTRUCTIONS
For the spasms:  Increase the baclofen 10 mg tablets to 2 tablets, up to three times daily.  If this helps, please call the office in two weeks so I can change the prescription.    You will need to speak to your primary team about the joint pain.

## 2024-01-25 NOTE — PROGRESS NOTES
NEUROLOGY OUTPATIENT FOLLOW-UP NOTE    Assessment/Plan   Diagnoses and all orders for this visit:  Multiple sclerosis (CMS/HCC)      IMPRESSION:  Multiple sclerosis.  Bilateral calf spasms.    PLAN:  To continue Mayzent 2 mg daily for MS prophylaxis.  I advised her to increase baclofen to 20 mg tid for the spasms.  To continue amantadine for fatigue.  I will  see her again in 3-4 months or prn.      Shimon Page Jr., M.D., FAAN   ----------    Subjective     Roberta Dewey is a 58 y.o. year old female here for follow-up.    Has spasms worsened in the last two months in that she has spasms of the calves and the feet, though the thighs can be involved.  She denies a specific movement trigger for the spasms.  She also has tingling of the plantar feet.    Insomnia with difficulty in sleep onset, but not continuation.    She denies new focal neurological symptoms including dysarthria, dysphagia, diplopia, focal weakness, focal sensory change, ataxia, vertigo, or bowel/bladder incontinence, among others.    Patient Active Problem List   Diagnosis    Anemia    Arthritis    Asthma    Back pain    Acute bilateral low back pain without sciatica    Chronic bilateral low back pain with sciatica    Neck pain, chronic    Chronic pain of both shoulders    HTN (hypertension)    High cholesterol    Impaired gait and mobility    Irregular menstrual cycle    Low serum vitamin B12    Low vitamin D level    Malignant neoplasm of ascending colon (CMS/HCC)    Malignant neoplasm of colon (CMS/HCC)    Rectal cancer (CMS/HCC)    Multiple sclerosis (CMS/HCC)    Myopia    Paraparesis (CMS/HCC)    Presbyopia    S/P colectomy    Spasticity    Vitamin D deficiency    Weakness    Mammogram declined    At high risk for falls    Swelling of finger    Pain of finger    Fall    Dry scalp    Influenza vaccination declined by patient     Past Medical History:   Diagnosis Date    Constipation 12/06/2012    Contusion of left shoulder, initial  encounter 02/08/2019    Contusion of left shoulder, initial encounter    Dizziness 03/15/2016    Encounter for blood-alcohol and blood-drug test 03/21/2019    Encounter for drug screening    Encounter for general adult medical examination without abnormal findings 08/03/2021    Medicare annual wellness visit, subsequent    Inconclusive mammogram 03/27/2019    Dense breast tissue    Low back pain, unspecified     Acute bilateral low back pain without sciatica    Lumbago with sciatica, unspecified side 04/06/2020    Chronic bilateral low back pain with sciatica    Nausea 03/15/2016    Pain in left shoulder 10/07/2019    Left shoulder pain    Pain in right shoulder 11/22/2019    Chronic pain of both shoulders    Pain in right shoulder 05/31/2019    Shoulder pain, bilateral    Personal history of cervical dysplasia 03/27/2019    History of cervical dysplasia    Personal history of diseases of the blood and blood-forming organs and certain disorders involving the immune mechanism     History of anemia    Personal history of other diseases of the digestive system     H/O constipation    Personal history of other diseases of the musculoskeletal system and connective tissue 11/22/2019    History of low back pain    Personal history of other diseases of the musculoskeletal system and connective tissue 11/14/2019    History of neck pain    Personal history of other diseases of the musculoskeletal system and connective tissue 11/22/2019    History of neck pain    Personal history of other diseases of the nervous system and sense organs     History of myopia    Personal history of other endocrine, nutritional and metabolic disease 07/14/2021    History of elevated lipids    Personal history of other endocrine, nutritional and metabolic disease 08/19/2020    History of vitamin D deficiency    Personal history of other infectious and parasitic diseases     History of scarlet fever    Personal history of other infectious and  parasitic diseases 04/06/2020    History of tinea capitis    Personal history of other infectious and parasitic diseases 04/15/2020    History of athlete's foot    Personal history of other malignant neoplasm of large intestine     History of malignant neoplasm of colon    Personal history of other mental and behavioral disorders 01/08/2021    History of depression    Personal history of other specified conditions     History of abdominal pain    Personal history of other specified conditions     History of dizziness    Personal history of other specified conditions     H/O nausea    Personal history of other specified conditions 02/26/2020    History of abnormal mammogram    Reaction to severe stress, unspecified 09/26/2019    Situational stress    Seborrhea capitis 08/19/2020    Dandruff, adult    Unspecified asthma with (acute) exacerbation 04/06/2020    Acute asthma exacerbation    Unspecified injury of left foot, initial encounter 04/15/2020    Foot injury, left, initial encounter     Past Surgical History:   Procedure Laterality Date    OTHER SURGICAL HISTORY  02/08/2019    Colectomy    OTHER SURGICAL HISTORY  02/08/2019    Colonoscopy    OTHER SURGICAL HISTORY  02/08/2019    Hysterectomy     Social History     Tobacco Use    Smoking status: Never    Smokeless tobacco: Never   Substance Use Topics    Alcohol use: Never     family history is not on file.    Current Outpatient Medications:     acetaminophen (Tylenol) 325 mg tablet, Take 2 tablets (650 mg) by mouth every 6 hours if needed for moderate pain (4 - 6)., Disp: , Rfl:     albuterol 90 mcg/actuation inhaler, INHALE 2 INHALATIONS BY MOUTH  EVERY 4 HOURS IF NEEDED FOR  WHEEZING, Disp: 51 g, Rfl: 2    amLODIPine (Norvasc) 2.5 mg tablet, Take 1 tablet (2.5 mg) by mouth once daily., Disp: 90 tablet, Rfl: 3    atorvastatin (Lipitor) 40 mg tablet, Take 1 tablet (40 mg) by mouth once daily at bedtime., Disp: 90 tablet, Rfl: 3    baclofen (Lioresal) 10 mg  "tablet, Take 1 tablet (10 mg) by mouth 3 times a day., Disp: 90 tablet, Rfl: 3    ergocalciferol (Vitamin D2) 1.25 MG (04252 UT) capsule, Take 1 capsule (1,250 mcg) by mouth 1 (one) time per week., Disp: 12 capsule, Rfl: 3    ketoconazole (NIZOral) 2 % shampoo, APPLY 5 TO 10 ML TOPICALLY AND  SHAMPOO ONCE WEEKLY. LEAVE ON  FOR 5 TO 10 MINUTES THEN RINSE, Disp: 120 mL, Rfl: 0    Mayzent Starter,for 2mg maint, 0.25 mg (12 tabs) tablets,dose pack tablet dose pack, TAKE BY MOUTH AS DIRECTED PER  PACKAGE THEN AS DIRECTED  THEREAFTER (Patient not taking: Reported on 1/25/2024), Disp: 12 tablet, Rfl: 0    multivitamin tablet, Take 1 tablet by mouth once daily., Disp: , Rfl:     siponimod (Mayzent) 2 mg tablet tablet, Take 1 tablet (2 mg) by mouth once daily. (Patient not taking: Reported on 1/25/2024), Disp: 90 tablet, Rfl: 1    sodium chloride (Ocean) 0.65 % nasal spray, Administer 2 sprays into affected nostril(s)., Disp: , Rfl:   Allergies   Allergen Reactions    Aloe Vera Rash    Metronidazole Diarrhea    Sulfa (Sulfonamide Antibiotics) Rash     Throat swells    Tetracycline Diarrhea    Aspirin,Buffd-Calcium Carb-Mag Other    Docusate Sodium Other    Influenza Virus Vaccines Unknown     Gets very sick afterwards    Oseltamivir Other    Statins-Hmg-Coa Reductase Inhibitors Unknown    Sulfamethoxazole-Trimethoprim Other    Soap Rash     [facility] spring soap       Objective     /69   Pulse 69   Temp 36.3 °C (97.3 °F)   Ht 1.549 m (5' 1\")   Wt 66.7 kg (147 lb)   BMI 27.78 kg/m²     CONSTITUTIONAL:  No acute distress    MENTAL STATUS:  Awake, alert, fully oriented to self, place, and time, with present short-term memory, good awareness of recent events, normal attention span, concentration, and fund of knowledge.    SPEECH AND LANGUAGE:  Can name and repeat, follows all commands, has no dysarthria    CRANIAL NERVES:  II-Vision present, visual fields full to confrontational testing    III/IV/VI--EOMs are present in " all directions.  Pupils are symmetrically reactive in dim light.  No ptosis.    V--Normal facial sensation.    VII--No facial asymmetry.    VIII--Hearing present to voice bilaterally.    IX/X--Symmetric soft palate rise.    XI--Normal trapezius power bilaterally.    XII--Tongue protrudes without deviation.    MOTOR:  Normal power, tone, and bulk in both arms and both legs.  Hypertonia in both legs, more the right.     SENSORY:  Spinal sensory level at C7. Otherwise normal pin sensation throughout, without distal-proximal gradient or asymmetry. Proprioception is normal.     COORDINATION:  Normal finger-to-nose and heel-to-shin testing in both arms and both legs.    REFLEXES are normal and symmetric at the biceps, triceps, brachioradialis, patella, and ankle.  The plantar responses are flexor.    GAIT is spastic but steadier with a single-post cane.       Shimon Page Jr., M.D., [facility]

## 2024-02-05 ENCOUNTER — LAB (OUTPATIENT)
Dept: LAB | Facility: LAB | Age: 59
End: 2024-02-05
Payer: COMMERCIAL

## 2024-02-05 ENCOUNTER — OFFICE VISIT (OUTPATIENT)
Dept: PRIMARY CARE | Facility: CLINIC | Age: 59
End: 2024-02-05
Payer: COMMERCIAL

## 2024-02-05 VITALS
OXYGEN SATURATION: 96 % | HEART RATE: 72 BPM | DIASTOLIC BLOOD PRESSURE: 80 MMHG | SYSTOLIC BLOOD PRESSURE: 140 MMHG | WEIGHT: 141 LBS | RESPIRATION RATE: 20 BRPM | HEIGHT: 61 IN | BODY MASS INDEX: 26.62 KG/M2

## 2024-02-05 DIAGNOSIS — G89.29 CHRONIC BILATERAL LOW BACK PAIN WITH SCIATICA, SCIATICA LATERALITY UNSPECIFIED: ICD-10-CM

## 2024-02-05 DIAGNOSIS — E53.8 LOW SERUM VITAMIN B12: ICD-10-CM

## 2024-02-05 DIAGNOSIS — Z91.81 AT HIGH RISK FOR FALLS: ICD-10-CM

## 2024-02-05 DIAGNOSIS — G82.20 PARAPARESIS (MULTI): ICD-10-CM

## 2024-02-05 DIAGNOSIS — D64.9 ANEMIA, UNSPECIFIED TYPE: ICD-10-CM

## 2024-02-05 DIAGNOSIS — I10 HYPERTENSION, UNSPECIFIED TYPE: ICD-10-CM

## 2024-02-05 DIAGNOSIS — D72.9 ABNORMAL WBC COUNT: ICD-10-CM

## 2024-02-05 DIAGNOSIS — R25.2 SPASTICITY: ICD-10-CM

## 2024-02-05 DIAGNOSIS — Z28.21 INFLUENZA VACCINATION DECLINED BY PATIENT: ICD-10-CM

## 2024-02-05 DIAGNOSIS — E55.9 VITAMIN D DEFICIENCY: ICD-10-CM

## 2024-02-05 DIAGNOSIS — Z53.20 MAMMOGRAM DECLINED: ICD-10-CM

## 2024-02-05 DIAGNOSIS — G35 MULTIPLE SCLEROSIS (MULTI): ICD-10-CM

## 2024-02-05 DIAGNOSIS — M54.40 CHRONIC BILATERAL LOW BACK PAIN WITH SCIATICA, SCIATICA LATERALITY UNSPECIFIED: ICD-10-CM

## 2024-02-05 DIAGNOSIS — F43.21 GRIEF: ICD-10-CM

## 2024-02-05 DIAGNOSIS — C18.9 MALIGNANT NEOPLASM OF COLON, UNSPECIFIED PART OF COLON (MULTI): ICD-10-CM

## 2024-02-05 DIAGNOSIS — F32.A DEPRESSIVE DISORDER: ICD-10-CM

## 2024-02-05 DIAGNOSIS — E78.00 HIGH CHOLESTEROL: ICD-10-CM

## 2024-02-05 DIAGNOSIS — M19.90 ARTHRITIS: ICD-10-CM

## 2024-02-05 DIAGNOSIS — Z00.00 HEALTHCARE MAINTENANCE: ICD-10-CM

## 2024-02-05 DIAGNOSIS — Z00.00 HEALTHCARE MAINTENANCE: Primary | ICD-10-CM

## 2024-02-05 PROBLEM — Z85.038 HISTORY OF MALIGNANT NEOPLASM OF COLON: Status: ACTIVE | Noted: 2024-02-05

## 2024-02-05 PROBLEM — E78.5 ELEVATED LIPIDS: Status: ACTIVE | Noted: 2023-10-09

## 2024-02-05 PROBLEM — S99.922A INJURY OF LEFT FOOT: Status: RESOLVED | Noted: 2024-02-05 | Resolved: 2024-02-05

## 2024-02-05 PROBLEM — N87.9 CERVICAL DYSPLASIA: Status: ACTIVE | Noted: 2024-02-05

## 2024-02-05 PROBLEM — Z86.69 HISTORY OF VISUAL DISTURBANCE: Status: RESOLVED | Noted: 2024-02-05 | Resolved: 2024-02-05

## 2024-02-05 PROBLEM — B35.0 TINEA CAPITIS: Status: ACTIVE | Noted: 2024-02-05

## 2024-02-05 PROBLEM — Z86.19 HISTORY OF SCARLATINA: Status: ACTIVE | Noted: 2024-02-05

## 2024-02-05 PROBLEM — R92.30 DENSE BREAST TISSUE: Status: ACTIVE | Noted: 2024-02-05

## 2024-02-05 PROBLEM — Z86.2 HISTORY OF ANEMIA: Status: ACTIVE | Noted: 2024-02-05

## 2024-02-05 LAB
25(OH)D3 SERPL-MCNC: 33 NG/ML (ref 30–100)
ALBUMIN SERPL BCP-MCNC: 4.3 G/DL (ref 3.4–5)
ALP SERPL-CCNC: 60 U/L (ref 33–136)
ALT SERPL W P-5'-P-CCNC: 23 U/L (ref 7–45)
ANION GAP SERPL CALC-SCNC: 12 MMOL/L (ref 10–20)
APPEARANCE UR: CLEAR
AST SERPL W P-5'-P-CCNC: 26 U/L (ref 9–39)
BASOPHILS # BLD AUTO: 0.03 X10*3/UL (ref 0–0.1)
BASOPHILS NFR BLD AUTO: 1.1 %
BILIRUB SERPL-MCNC: 1.1 MG/DL (ref 0–1.2)
BILIRUB UR STRIP.AUTO-MCNC: NEGATIVE MG/DL
BUN SERPL-MCNC: 13 MG/DL (ref 6–23)
CALCIUM SERPL-MCNC: 9.4 MG/DL (ref 8.6–10.3)
CHLORIDE SERPL-SCNC: 104 MMOL/L (ref 98–107)
CHOLEST SERPL-MCNC: 183 MG/DL (ref 0–199)
CHOLESTEROL/HDL RATIO: 2.6
CO2 SERPL-SCNC: 27 MMOL/L (ref 21–32)
COLOR UR: YELLOW
CREAT SERPL-MCNC: 0.59 MG/DL (ref 0.5–1.05)
EGFRCR SERPLBLD CKD-EPI 2021: >90 ML/MIN/1.73M*2
EOSINOPHIL # BLD AUTO: 0.06 X10*3/UL (ref 0–0.7)
EOSINOPHIL NFR BLD AUTO: 2.2 %
ERYTHROCYTE [DISTWIDTH] IN BLOOD BY AUTOMATED COUNT: 13.2 % (ref 11.5–14.5)
GLUCOSE SERPL-MCNC: 88 MG/DL (ref 74–99)
GLUCOSE UR STRIP.AUTO-MCNC: NEGATIVE MG/DL
HCT VFR BLD AUTO: 40.4 % (ref 36–46)
HDLC SERPL-MCNC: 70.6 MG/DL
HGB BLD-MCNC: 12.8 G/DL (ref 12–16)
IMM GRANULOCYTES # BLD AUTO: 0.03 X10*3/UL (ref 0–0.7)
IMM GRANULOCYTES NFR BLD AUTO: 1.1 % (ref 0–0.9)
KETONES UR STRIP.AUTO-MCNC: NEGATIVE MG/DL
LDLC SERPL CALC-MCNC: 100 MG/DL
LEUKOCYTE ESTERASE UR QL STRIP.AUTO: NEGATIVE
LYMPHOCYTES # BLD AUTO: 0.23 X10*3/UL (ref 1.2–4.8)
LYMPHOCYTES NFR BLD AUTO: 8.6 %
MCH RBC QN AUTO: 27.9 PG (ref 26–34)
MCHC RBC AUTO-ENTMCNC: 31.7 G/DL (ref 32–36)
MCV RBC AUTO: 88 FL (ref 80–100)
MONOCYTES # BLD AUTO: 0.48 X10*3/UL (ref 0.1–1)
MONOCYTES NFR BLD AUTO: 17.9 %
NEUTROPHILS # BLD AUTO: 1.85 X10*3/UL (ref 1.2–7.7)
NEUTROPHILS NFR BLD AUTO: 69.1 %
NITRITE UR QL STRIP.AUTO: NEGATIVE
NON HDL CHOLESTEROL: 112 MG/DL (ref 0–149)
NRBC BLD-RTO: 0 /100 WBCS (ref 0–0)
PH UR STRIP.AUTO: 7 [PH]
PLATELET # BLD AUTO: 315 X10*3/UL (ref 150–450)
POTASSIUM SERPL-SCNC: 3.9 MMOL/L (ref 3.5–5.3)
PROT SERPL-MCNC: 7.4 G/DL (ref 6.4–8.2)
PROT UR STRIP.AUTO-MCNC: NEGATIVE MG/DL
RBC # BLD AUTO: 4.58 X10*6/UL (ref 4–5.2)
RBC # UR STRIP.AUTO: NEGATIVE /UL
SODIUM SERPL-SCNC: 139 MMOL/L (ref 136–145)
SP GR UR STRIP.AUTO: 1.02
TRIGL SERPL-MCNC: 63 MG/DL (ref 0–149)
TSH SERPL-ACNC: 0.64 MIU/L (ref 0.44–3.98)
UROBILINOGEN UR STRIP.AUTO-MCNC: <2 MG/DL
VIT B12 SERPL-MCNC: 714 PG/ML (ref 211–911)
VLDL: 13 MG/DL (ref 0–40)
WBC # BLD AUTO: 2.7 X10*3/UL (ref 4.4–11.3)

## 2024-02-05 PROCEDURE — 1036F TOBACCO NON-USER: CPT

## 2024-02-05 PROCEDURE — 81003 URINALYSIS AUTO W/O SCOPE: CPT

## 2024-02-05 PROCEDURE — 82607 VITAMIN B-12: CPT

## 2024-02-05 PROCEDURE — 99396 PREV VISIT EST AGE 40-64: CPT

## 2024-02-05 PROCEDURE — 80061 LIPID PANEL: CPT

## 2024-02-05 PROCEDURE — 80053 COMPREHEN METABOLIC PANEL: CPT

## 2024-02-05 PROCEDURE — 84443 ASSAY THYROID STIM HORMONE: CPT

## 2024-02-05 PROCEDURE — 3077F SYST BP >= 140 MM HG: CPT

## 2024-02-05 PROCEDURE — 36415 COLL VENOUS BLD VENIPUNCTURE: CPT

## 2024-02-05 PROCEDURE — 3079F DIAST BP 80-89 MM HG: CPT

## 2024-02-05 PROCEDURE — 82306 VITAMIN D 25 HYDROXY: CPT

## 2024-02-05 PROCEDURE — 85025 COMPLETE CBC W/AUTO DIFF WBC: CPT

## 2024-02-05 RX ORDER — CALCIUM CARBONATE 750 MG/1
1 TABLET, CHEWABLE ORAL DAILY
Qty: 1 KIT | Refills: 0 | Status: SHIPPED | OUTPATIENT
Start: 2024-02-05

## 2024-02-05 RX ORDER — ATORVASTATIN CALCIUM 40 MG/1
40 TABLET, FILM COATED ORAL NIGHTLY
Qty: 90 TABLET | Refills: 3 | Status: SHIPPED | OUTPATIENT
Start: 2024-02-05

## 2024-02-05 RX ORDER — GABAPENTIN 300 MG/1
CAPSULE ORAL
COMMUNITY
Start: 2019-02-08 | End: 2024-02-05 | Stop reason: WASHOUT

## 2024-02-05 RX ORDER — AMANTADINE HYDROCHLORIDE 100 MG/1
TABLET ORAL
COMMUNITY
Start: 2020-01-30

## 2024-02-05 RX ORDER — LYSINE HCL 500 MG
TABLET ORAL
COMMUNITY
Start: 2019-03-27

## 2024-02-05 RX ORDER — ERGOCALCIFEROL 1.25 MG/1
1 CAPSULE ORAL
Qty: 12 CAPSULE | Refills: 0 | Status: SHIPPED | OUTPATIENT
Start: 2024-02-05

## 2024-02-05 RX ORDER — LANOLIN ALCOHOL/MO/W.PET/CERES
CREAM (GRAM) TOPICAL
COMMUNITY
Start: 2019-02-08

## 2024-02-05 RX ORDER — AMLODIPINE BESYLATE 5 MG/1
5 TABLET ORAL DAILY
Qty: 90 TABLET | Refills: 3 | Status: SHIPPED | OUTPATIENT
Start: 2024-02-05

## 2024-02-05 ASSESSMENT — ENCOUNTER SYMPTOMS
TREMORS: 0
ACTIVITY CHANGE: 0
CONSTITUTIONAL NEGATIVE: 1
ABDOMINAL DISTENTION: 0
LIGHT-HEADEDNESS: 0
CHEST TIGHTNESS: 0
HYPERACTIVE: 0
ANAL BLEEDING: 0
BACK PAIN: 1
MYALGIAS: 1
EYE DISCHARGE: 0
DIFFICULTY URINATING: 0
PSYCHIATRIC NEGATIVE: 1
BRUISES/BLEEDS EASILY: 0
RECTAL PAIN: 0
CONSTIPATION: 0
COLOR CHANGE: 0
ABDOMINAL PAIN: 0
SORE THROAT: 0
FREQUENCY: 0
NEUROLOGICAL NEGATIVE: 1
NUMBNESS: 0
PALPITATIONS: 0
CHILLS: 0
NAUSEA: 0
ARTHRALGIAS: 1
OCCASIONAL FEELINGS OF UNSTEADINESS: 0
PHOTOPHOBIA: 0
DIARRHEA: 0
CONFUSION: 0
RESPIRATORY NEGATIVE: 1
STRIDOR: 0
NECK PAIN: 0
FEVER: 0
RHINORRHEA: 0
DYSPHORIC MOOD: 0
TROUBLE SWALLOWING: 0
ENDOCRINE NEGATIVE: 1
DYSURIA: 0
VOICE CHANGE: 0
BLOOD IN STOOL: 0
SEIZURES: 0
FATIGUE: 0
JOINT SWELLING: 0
APNEA: 0
GASTROINTESTINAL NEGATIVE: 1
DIAPHORESIS: 0
SINUS PRESSURE: 0
HEMATOLOGIC/LYMPHATIC NEGATIVE: 1
SLEEP DISTURBANCE: 0
UNEXPECTED WEIGHT CHANGE: 0
DIZZINESS: 0
NECK STIFFNESS: 0
CARDIOVASCULAR NEGATIVE: 1
LOSS OF SENSATION IN FEET: 1
EYES NEGATIVE: 1
WEAKNESS: 0
NERVOUS/ANXIOUS: 0
COUGH: 0
DEPRESSION: 0
APPETITE CHANGE: 0
WHEEZING: 0
HEADACHES: 0
POLYPHAGIA: 0
SPEECH DIFFICULTY: 0
AGITATION: 0
FLANK PAIN: 0
POLYDIPSIA: 0
SHORTNESS OF BREATH: 0
HEMATURIA: 0

## 2024-02-05 ASSESSMENT — ACTIVITIES OF DAILY LIVING (ADL)
GROCERY_SHOPPING: INDEPENDENT
TAKING_MEDICATION: INDEPENDENT
DRESSING: INDEPENDENT
MANAGING_FINANCES: INDEPENDENT
BATHING: INDEPENDENT
DOING_HOUSEWORK: INDEPENDENT

## 2024-02-05 ASSESSMENT — PATIENT HEALTH QUESTIONNAIRE - PHQ9
1. LITTLE INTEREST OR PLEASURE IN DOING THINGS: NOT AT ALL
SUM OF ALL RESPONSES TO PHQ9 QUESTIONS 1 AND 2: 0
2. FEELING DOWN, DEPRESSED OR HOPELESS: NOT AT ALL

## 2024-02-05 NOTE — PATIENT INSTRUCTIONS
Baclofen: may increase by 5 mg per dose every 3 days based on response and tolerability:  Baclofen 15mg in the morning, Baclofen 10mg in the afternoon, Baclofen 10mg in the evening for 3 days  Baclofen 15mg in the morning, Baclofen 15mg in the afternoon, Baclofen 10mg in the evening for 3 days  Baclofen 15mg in the morning, Baclofen 15mg in the afternoon, Baclofen 15mg in the evening for 3 days  Baclofen 20mg in the morning, Baclofen 15mg in the afternoon, Baclofen 15mg in the evening for 3 days  Baclofen 20mg in the morning, Baclofen 20mg in the afternoon, Baclofen 15mg in the evening for 3 days  Baclofen 20mg in the morning, Baclofen 20mg in the afternoon, Baclofen 20mg in the evening for 3 days

## 2024-02-05 NOTE — PROGRESS NOTES
Primary Care Provider: RITA Sherwood-CNP    Subjective   Roberta Dewey is a 58 y.o. female who presents for Follow-up (BP Check) and Medicare Annual Wellness Visit Subsequent.    CPE    Needs medication refill     PMH of MS, HTN, Colon Cancer- stage III s/p Chemo, Asthma, HLD, CBP     PSH: Colon surgery  Medications: Updated in EMR  Allergies: NKDA  FH: Gastric cancer (grandparents), HLD, HTN, MS  SH: denies smoking, Etoh, drug use, previously worked as RN, currently on disability, not sexually active, feels safe     # MS: muscle spasms - increasing muscle spasms & pain; states she never started PT; saw Neurology increase baclofen to 20mg TID- patient was very symptomatic; she cut bad to 10mg TID and symptoms resolved/      # HTN: eats salty diet; hx of noncompliance with medications; does not have BP cuff at home to check     Would like orthotics for BL feet; arthritis    You were not complaining of chest pain, shortness of breath, dizziness, lightheadedness, abdominal pain, nausea, vomiting, numbness, tingling, frequent urination, burning with urination, cough, vision changes, or headaches.      Grief - lost a few family member recently     Chronic lumbar pain with BL sciatica ; wants medrol dose amna     Covid vaccine due; declines influenza vaccine- allergy     Declines mammogram     Rectal cancer; states she had a colonoscopy within the last year; she will send us the records     Vitamin B 12 def     Vitamin d def     No chest pain, no shortness of breath  BM regular  No issues with urination  Energy level is good           Review of Systems   Constitutional: Negative.  Negative for activity change, appetite change, chills, diaphoresis, fatigue, fever and unexpected weight change.   HENT: Negative.  Negative for congestion, dental problem, ear discharge, ear pain, hearing loss, mouth sores, nosebleeds, postnasal drip, rhinorrhea, sinus pressure, sneezing, sore throat, tinnitus, trouble swallowing and  "voice change.    Eyes: Negative.  Negative for photophobia, discharge and visual disturbance.   Respiratory: Negative.  Negative for apnea, cough, chest tightness, shortness of breath, wheezing and stridor.    Cardiovascular: Negative.  Negative for chest pain, palpitations and leg swelling.   Gastrointestinal: Negative.  Negative for abdominal distention, abdominal pain, anal bleeding, blood in stool, constipation, diarrhea, nausea and rectal pain.   Endocrine: Negative.  Negative for cold intolerance, heat intolerance, polydipsia, polyphagia and polyuria.   Genitourinary: Negative.  Negative for decreased urine volume, difficulty urinating, dysuria, flank pain, frequency, hematuria and urgency.   Musculoskeletal:  Positive for arthralgias, back pain and myalgias. Negative for gait problem, joint swelling, neck pain and neck stiffness.   Skin: Negative.  Negative for color change and rash.   Neurological: Negative.  Negative for dizziness, tremors, seizures, syncope, speech difficulty, weakness, light-headedness, numbness and headaches.   Hematological: Negative.  Does not bruise/bleed easily.   Psychiatric/Behavioral: Negative.  Negative for agitation, confusion, dysphoric mood, sleep disturbance and suicidal ideas. The patient is not nervous/anxious and is not hyperactive.    All other systems reviewed and are negative.        Objective   /80   Pulse 72   Resp 20   Ht 1.549 m (5' 1\")   Wt 64 kg (141 lb)   SpO2 96%   BMI 26.64 kg/m²     Physical Exam  Vitals reviewed.   Constitutional:       General: She is not in acute distress.     Appearance: Normal appearance. She is normal weight. She is not ill-appearing, toxic-appearing or diaphoretic.   HENT:      Head: Normocephalic and atraumatic.      Right Ear: Tympanic membrane, ear canal and external ear normal.      Left Ear: Tympanic membrane, ear canal and external ear normal.      Nose: Nose normal.   Eyes:      Extraocular Movements: Extraocular " movements intact.      Conjunctiva/sclera: Conjunctivae normal.      Pupils: Pupils are equal, round, and reactive to light.   Neck:      Vascular: No carotid bruit.   Cardiovascular:      Rate and Rhythm: Normal rate and regular rhythm.      Pulses: Normal pulses.      Heart sounds: Normal heart sounds. No murmur heard.     No friction rub. No gallop.   Pulmonary:      Effort: Pulmonary effort is normal. No respiratory distress.      Breath sounds: Normal breath sounds.   Abdominal:      General: Abdomen is flat. Bowel sounds are normal.      Palpations: Abdomen is soft.   Musculoskeletal:         General: Normal range of motion.      Cervical back: Normal range of motion and neck supple.   Lymphadenopathy:      Cervical: No cervical adenopathy.   Skin:     General: Skin is warm and dry.      Capillary Refill: Capillary refill takes less than 2 seconds.   Neurological:      General: No focal deficit present.      Mental Status: She is alert and oriented to person, place, and time. Mental status is at baseline.   Psychiatric:         Mood and Affect: Mood normal.         Behavior: Behavior normal.         Thought Content: Thought content normal.         Judgment: Judgment normal.         Assessment/Plan   Problem List Items Addressed This Visit       Anemia    Relevant Orders    CBC and Auto Differential (Completed)    Comprehensive metabolic panel (Completed)    Lipid Panel (Completed)    Vitamin B12    TSH with reflex to Free T4 if abnormal (Completed)    Urinalysis with Reflex Microscopic (Completed)    Arthritis    Relevant Orders    Referral to Podiatry    Chronic bilateral low back pain with sciatica    Relevant Orders    Referral to Physical Therapy    HTN (hypertension)    Relevant Medications    amLODIPine (Norvasc) 5 mg tablet    blood pressure test kit-medium kit    Other Relevant Orders    CBC and Auto Differential (Completed)    Comprehensive metabolic panel (Completed)    Lipid Panel (Completed)     Vitamin B12    TSH with reflex to Free T4 if abnormal (Completed)    Urinalysis with Reflex Microscopic (Completed)    High cholesterol    Relevant Medications    atorvastatin (Lipitor) 40 mg tablet    Low serum vitamin B12    Relevant Orders    CBC and Auto Differential (Completed)    Comprehensive metabolic panel (Completed)    Lipid Panel (Completed)    Vitamin B12    TSH with reflex to Free T4 if abnormal (Completed)    Urinalysis with Reflex Microscopic (Completed)    Malignant neoplasm of colon (CMS/HCC)    Relevant Orders    CBC and Auto Differential (Completed)    Comprehensive metabolic panel (Completed)    Lipid Panel (Completed)    Vitamin B12    TSH with reflex to Free T4 if abnormal (Completed)    Urinalysis with Reflex Microscopic (Completed)    Multiple sclerosis (CMS/HCC)    Relevant Orders    CBC and Auto Differential (Completed)    Comprehensive metabolic panel (Completed)    Lipid Panel (Completed)    Vitamin B12    TSH with reflex to Free T4 if abnormal (Completed)    Urinalysis with Reflex Microscopic (Completed)    Referral to Physical Therapy    Paraparesis (CMS/[facility])    Relevant Orders    CBC and Auto Differential (Completed)    Comprehensive metabolic panel (Completed)    Lipid Panel (Completed)    Vitamin B12    TSH with reflex to Free T4 if abnormal (Completed)    Urinalysis with Reflex Microscopic (Completed)    Referral to Physical Therapy    Spasticity    Relevant Orders    CBC and Auto Differential (Completed)    Comprehensive metabolic panel (Completed)    Lipid Panel (Completed)    Vitamin B12    TSH with reflex to Free T4 if abnormal (Completed)    Urinalysis with Reflex Microscopic (Completed)    Referral to Physical Therapy    Vitamin D deficiency    Relevant Medications    ergocalciferol (Vitamin D2) 1.25 MG (76790 UT) capsule    Other Relevant Orders    CBC and Auto Differential (Completed)    Comprehensive metabolic panel (Completed)    Lipid Panel (Completed)    Vitamin B12     TSH with reflex to Free T4 if abnormal (Completed)    Urinalysis with Reflex Microscopic (Completed)    Vitamin D 25-Hydroxy,Total (for eval of Vitamin D levels)    Mammogram declined    Relevant Orders    CBC and Auto Differential (Completed)    Comprehensive metabolic panel (Completed)    Lipid Panel (Completed)    Vitamin B12    TSH with reflex to Free T4 if abnormal (Completed)    Urinalysis with Reflex Microscopic (Completed)    At high risk for falls    Relevant Orders    Referral to Physical Therapy    Influenza vaccination declined by patient    Depressive disorder    Relevant Orders    Referral to Psychology    Healthcare maintenance - Primary    Relevant Orders    CBC and Auto Differential (Completed)    Comprehensive metabolic panel (Completed)    Lipid Panel (Completed)    Vitamin B12    TSH with reflex to Free T4 if abnormal (Completed)    Urinalysis with Reflex Microscopic (Completed)     Other Visit Diagnoses       Grief        Relevant Orders    Referral to Psychology          # MS: muscle spasms - increasing muscle spasms & pain; states she never started PT; saw Neurology increase baclofen to 20mg TID- patient was very symptomatic; she cut bad to 10mg TID and symptoms resolved. Gave her guidelines to slowly increase baclofen and to stop titration up if symptoms occur. (Goal baclofen 20mg TID); can take tylenol as well. Start PT. Continue following with neurology.     # HTN: eats salty diet; hx of noncompliance with medications; does not have BP cuff at home to check

## 2024-02-05 NOTE — LETTER
February 5, 2024     Patient: Roberta Dewey   YOB: 1965   Date of Visit: 2/5/2024       To Whom It May Concern:    Roberta Dewey was seen in my clinic on 2/5/2024. Please excuse Roberta for her absence from work on this day to make the appointment.    If you have any questions or concerns, please don't hesitate to call.         Sincerely,         RITA Sherwood-CNP        CC: No Recipients

## 2024-02-06 ENCOUNTER — TELEPHONE (OUTPATIENT)
Dept: PRIMARY CARE | Facility: CLINIC | Age: 59
End: 2024-02-06
Payer: COMMERCIAL

## 2024-02-06 NOTE — TELEPHONE ENCOUNTER
Per SILVIA Simpson... Informed Patient Normal liver function, normal kidney function, normal thyroid function, cholesterol was great, urinalysis was normal. I would like to recheck your blood cell counts in 1 month for abnormal white blood cells. Please go to lab in 1 month to get this drawn.

## 2024-05-29 ENCOUNTER — TELEPHONE (OUTPATIENT)
Dept: PRIMARY CARE | Facility: CLINIC | Age: 59
End: 2024-05-29

## 2024-07-25 ENCOUNTER — APPOINTMENT (OUTPATIENT)
Dept: NEUROLOGY | Facility: CLINIC | Age: 59
End: 2024-07-25
Payer: COMMERCIAL

## 2024-08-09 ENCOUNTER — APPOINTMENT (OUTPATIENT)
Dept: PRIMARY CARE | Facility: CLINIC | Age: 59
End: 2024-08-09
Payer: COMMERCIAL

## 2024-08-23 ENCOUNTER — APPOINTMENT (OUTPATIENT)
Dept: PRIMARY CARE | Facility: CLINIC | Age: 59
End: 2024-08-23
Payer: COMMERCIAL

## 2024-09-09 ENCOUNTER — APPOINTMENT (OUTPATIENT)
Dept: PRIMARY CARE | Facility: CLINIC | Age: 59
End: 2024-09-09
Payer: COMMERCIAL

## 2024-09-09 VITALS
HEIGHT: 61 IN | SYSTOLIC BLOOD PRESSURE: 132 MMHG | BODY MASS INDEX: 28.89 KG/M2 | HEART RATE: 68 BPM | WEIGHT: 153 LBS | DIASTOLIC BLOOD PRESSURE: 66 MMHG

## 2024-09-09 DIAGNOSIS — G35 MULTIPLE SCLEROSIS (MULTI): ICD-10-CM

## 2024-09-09 DIAGNOSIS — G89.29 CHRONIC BILATERAL LOW BACK PAIN WITH BILATERAL SCIATICA: ICD-10-CM

## 2024-09-09 DIAGNOSIS — C18.2 MALIGNANT NEOPLASM OF ASCENDING COLON (MULTI): ICD-10-CM

## 2024-09-09 DIAGNOSIS — C20 RECTAL CANCER (MULTI): ICD-10-CM

## 2024-09-09 DIAGNOSIS — I10 HYPERTENSION, UNSPECIFIED TYPE: Primary | ICD-10-CM

## 2024-09-09 DIAGNOSIS — J45.20 MILD INTERMITTENT ASTHMA WITHOUT COMPLICATION (HHS-HCC): ICD-10-CM

## 2024-09-09 DIAGNOSIS — E78.00 HIGH CHOLESTEROL: ICD-10-CM

## 2024-09-09 DIAGNOSIS — M54.41 CHRONIC BILATERAL LOW BACK PAIN WITH BILATERAL SCIATICA: ICD-10-CM

## 2024-09-09 DIAGNOSIS — Z71.89 ADVANCE DIRECTIVE DISCUSSED WITH PATIENT: ICD-10-CM

## 2024-09-09 DIAGNOSIS — Z53.20 MAMMOGRAM DECLINED: ICD-10-CM

## 2024-09-09 DIAGNOSIS — M54.42 CHRONIC BILATERAL LOW BACK PAIN WITH BILATERAL SCIATICA: ICD-10-CM

## 2024-09-09 PROCEDURE — 3078F DIAST BP <80 MM HG: CPT

## 2024-09-09 PROCEDURE — 1036F TOBACCO NON-USER: CPT

## 2024-09-09 PROCEDURE — 99214 OFFICE O/P EST MOD 30 MIN: CPT

## 2024-09-09 PROCEDURE — 3008F BODY MASS INDEX DOCD: CPT

## 2024-09-09 PROCEDURE — 3075F SYST BP GE 130 - 139MM HG: CPT

## 2024-09-09 RX ORDER — DULOXETIN HYDROCHLORIDE 30 MG/1
30 CAPSULE, DELAYED RELEASE ORAL DAILY
Qty: 30 CAPSULE | Refills: 0 | Status: SHIPPED | OUTPATIENT
Start: 2024-09-09 | End: 2024-10-09

## 2024-09-09 ASSESSMENT — ENCOUNTER SYMPTOMS
FEVER: 0
APNEA: 0
CARDIOVASCULAR NEGATIVE: 1
CONSTIPATION: 0
DIFFICULTY URINATING: 0
SINUS PRESSURE: 0
FREQUENCY: 0
ACTIVITY CHANGE: 0
HEADACHES: 0
PHOTOPHOBIA: 0
PSYCHIATRIC NEGATIVE: 1
DEPRESSION: 0
WHEEZING: 0
HYPERACTIVE: 0
EYES NEGATIVE: 1
COLOR CHANGE: 0
DIARRHEA: 0
CHILLS: 0
UNEXPECTED WEIGHT CHANGE: 0
ABDOMINAL PAIN: 0
DIAPHORESIS: 0
HEMATOLOGIC/LYMPHATIC NEGATIVE: 1
SEIZURES: 0
OCCASIONAL FEELINGS OF UNSTEADINESS: 0
FATIGUE: 0
NAUSEA: 0
ANAL BLEEDING: 0
PALPITATIONS: 0
CHEST TIGHTNESS: 0
SHORTNESS OF BREATH: 0
TROUBLE SWALLOWING: 0
WEAKNESS: 0
TREMORS: 0
RECTAL PAIN: 0
BACK PAIN: 1
DYSURIA: 0
NEUROLOGICAL NEGATIVE: 1
AGITATION: 0
NECK STIFFNESS: 0
APPETITE CHANGE: 0
LOSS OF SENSATION IN FEET: 0
NERVOUS/ANXIOUS: 0
VOICE CHANGE: 0
ENDOCRINE NEGATIVE: 1
EYE DISCHARGE: 0
POLYPHAGIA: 0
ARTHRALGIAS: 1
FLANK PAIN: 0
POLYDIPSIA: 0
RHINORRHEA: 0
ABDOMINAL DISTENTION: 0
GASTROINTESTINAL NEGATIVE: 1
COUGH: 0
BLOOD IN STOOL: 0
LIGHT-HEADEDNESS: 0
MYALGIAS: 0
JOINT SWELLING: 0
CONSTITUTIONAL NEGATIVE: 1
NUMBNESS: 0
SPEECH DIFFICULTY: 0
NECK PAIN: 0
DIZZINESS: 0
HEMATURIA: 0
SLEEP DISTURBANCE: 0
STRIDOR: 0
CONFUSION: 0
BRUISES/BLEEDS EASILY: 0
DYSPHORIC MOOD: 0
SORE THROAT: 0
RESPIRATORY NEGATIVE: 1

## 2024-09-09 NOTE — PROGRESS NOTES
Primary Care Provider: RITA Sherwood-CNP    Subjective   Roberta Dewey is a 59 y.o. female who presents for Follow-up (6 month follow up).    HPI   FUV    HTN    MS- last saw provider 7/2024  Needs to get back in with     Hx of right colon cancer  CT scans in 5/22/2022  no evidence of recurrence   Last colonoscopy 12/10/2019  Colonoscopy due- she states she will get this at [facility]    mammogram due- patient declines    Sister- Angela Ceja is her Healthcare POA; [phone]; need copy of her completed paperwork; full code    Asthma- no s/s    HLD    Some back pain- chronic    Review of Systems   Constitutional: Negative.  Negative for activity change, appetite change, chills, diaphoresis, fatigue, fever and unexpected weight change.   HENT: Negative.  Negative for congestion, dental problem, ear discharge, ear pain, hearing loss, mouth sores, nosebleeds, postnasal drip, rhinorrhea, sinus pressure, sneezing, sore throat, tinnitus, trouble swallowing and voice change.    Eyes: Negative.  Negative for photophobia, discharge and visual disturbance.   Respiratory: Negative.  Negative for apnea, cough, chest tightness, shortness of breath, wheezing and stridor.    Cardiovascular: Negative.  Negative for chest pain, palpitations and leg swelling.   Gastrointestinal: Negative.  Negative for abdominal distention, abdominal pain, anal bleeding, blood in stool, constipation, diarrhea, nausea and rectal pain.   Endocrine: Negative.  Negative for cold intolerance, heat intolerance, polydipsia, polyphagia and polyuria.   Genitourinary: Negative.  Negative for decreased urine volume, difficulty urinating, dysuria, flank pain, frequency, hematuria and urgency.   Musculoskeletal:  Positive for arthralgias and back pain. Negative for gait problem, joint swelling, myalgias, neck pain and neck stiffness.   Skin: Negative.  Negative for color change and rash.   Neurological: Negative.  Negative for dizziness, tremors,  "seizures, syncope, speech difficulty, weakness, light-headedness, numbness and headaches.   Hematological: Negative.  Does not bruise/bleed easily.   Psychiatric/Behavioral: Negative.  Negative for agitation, confusion, dysphoric mood, sleep disturbance and suicidal ideas. The patient is not nervous/anxious and is not hyperactive.    All other systems reviewed and are negative.        Objective   /66   Pulse 68   Ht 1.549 m (5' 1\")   Wt 69.4 kg (153 lb)   BMI 28.91 kg/m²     Physical Exam  Vitals reviewed.   Constitutional:       General: She is not in acute distress.     Appearance: Normal appearance. She is normal weight. She is not ill-appearing, toxic-appearing or diaphoretic.   HENT:      Head: Normocephalic and atraumatic.      Nose: Nose normal.   Eyes:      Conjunctiva/sclera: Conjunctivae normal.   Cardiovascular:      Rate and Rhythm: Normal rate and regular rhythm.      Pulses: Normal pulses.      Heart sounds: Normal heart sounds. No murmur heard.     No friction rub. No gallop.   Pulmonary:      Effort: Pulmonary effort is normal. No respiratory distress.      Breath sounds: Normal breath sounds.   Abdominal:      General: Abdomen is flat. Bowel sounds are normal.      Palpations: Abdomen is soft.   Musculoskeletal:         General: Normal range of motion.      Cervical back: Normal range of motion and neck supple.   Lymphadenopathy:      Cervical: No cervical adenopathy.   Skin:     General: Skin is warm and dry.      Capillary Refill: Capillary refill takes less than 2 seconds.   Neurological:      General: No focal deficit present.      Mental Status: She is alert and oriented to person, place, and time. Mental status is at baseline.   Psychiatric:         Mood and Affect: Mood normal.         Behavior: Behavior normal.         Thought Content: Thought content normal.         Judgment: Judgment normal.         Assessment/Plan   Problem List Items Addressed This Visit             ICD-10-CM "    Asthma ([facility])  Stable  Hasn't need to use albuterol inhaler recently J45.909    Chronic bilateral low back pain with sciatica M54.40, G89.29    Start Cymbalta   Relevant Medications    DULoxetine (Cymbalta) 30 mg DR capsule  RF Cymblata 60mg (increase from 30mg- around 10/9/24 as long as she does not have any SE)    HTN (hypertension) - Primary  Stable  C/w amlodipine I10    High cholesterol  Stable  C/w atorvastatin - does not always take though, encouraged her to do so E78.00    Malignant neoplasm of ascending colon (Multi)  Hx of right colon cancer  CT scans in 5/22/2022  no evidence of recurrence   Last colonoscopy 12/10/2019  Colonoscopy due- she states she will get this at [facility] C18.2    Rectal cancer (Multi)  Hx of right colon cancer  CT scans in 5/22/2022  no evidence of recurrence   Last colonoscopy 12/10/2019  Colonoscopy due- she states she will get this at [facility] C20    Multiple sclerosis (Multi) G35    Needs to get back in with neurology  Relevant Orders    Referral to Neurology    Mammogram declined Z53.20    Advance directive discussed with patient  Sister- Angela Ceja is her Healthcare POA; [phone]; need copy of her completed paperwork; full code Z71.89       Follow up in 4 months or sooner if needed

## 2024-09-29 DIAGNOSIS — M54.42 CHRONIC BILATERAL LOW BACK PAIN WITH BILATERAL SCIATICA: ICD-10-CM

## 2024-09-29 DIAGNOSIS — G89.29 CHRONIC BILATERAL LOW BACK PAIN WITH BILATERAL SCIATICA: ICD-10-CM

## 2024-09-29 DIAGNOSIS — M54.41 CHRONIC BILATERAL LOW BACK PAIN WITH BILATERAL SCIATICA: ICD-10-CM

## 2024-09-30 RX ORDER — DULOXETIN HYDROCHLORIDE 30 MG/1
30 CAPSULE, DELAYED RELEASE ORAL DAILY
Qty: 30 CAPSULE | Refills: 11 | Status: SHIPPED | OUTPATIENT
Start: 2024-09-30

## 2024-10-11 ENCOUNTER — APPOINTMENT (OUTPATIENT)
Dept: PRIMARY CARE | Facility: CLINIC | Age: 59
End: 2024-10-11
Payer: COMMERCIAL

## 2024-10-15 DIAGNOSIS — E78.00 HIGH CHOLESTEROL: ICD-10-CM

## 2024-10-15 DIAGNOSIS — I10 HYPERTENSION, UNSPECIFIED TYPE: ICD-10-CM

## 2024-10-16 RX ORDER — AMLODIPINE BESYLATE 5 MG/1
5 TABLET ORAL DAILY
Qty: 100 TABLET | Refills: 2 | Status: SHIPPED | OUTPATIENT
Start: 2024-10-16

## 2024-10-16 RX ORDER — ATORVASTATIN CALCIUM 40 MG/1
40 TABLET, FILM COATED ORAL NIGHTLY
Qty: 100 TABLET | Refills: 2 | Status: SHIPPED | OUTPATIENT
Start: 2024-10-16

## 2025-01-10 ENCOUNTER — APPOINTMENT (OUTPATIENT)
Dept: PRIMARY CARE | Facility: CLINIC | Age: 60
End: 2025-01-10
Payer: COMMERCIAL

## 2025-01-27 ENCOUNTER — APPOINTMENT (OUTPATIENT)
Dept: PRIMARY CARE | Facility: CLINIC | Age: 60
End: 2025-01-27
Payer: COMMERCIAL

## 2025-02-24 ENCOUNTER — APPOINTMENT (OUTPATIENT)
Dept: PRIMARY CARE | Facility: CLINIC | Age: 60
End: 2025-02-24
Payer: COMMERCIAL

## 2025-02-24 VITALS
WEIGHT: 159 LBS | OXYGEN SATURATION: 95 % | HEART RATE: 88 BPM | DIASTOLIC BLOOD PRESSURE: 82 MMHG | BODY MASS INDEX: 30.02 KG/M2 | SYSTOLIC BLOOD PRESSURE: 131 MMHG | HEIGHT: 61 IN

## 2025-02-24 DIAGNOSIS — Z00.00 ROUTINE GENERAL MEDICAL EXAMINATION AT A HEALTH CARE FACILITY: ICD-10-CM

## 2025-02-24 DIAGNOSIS — G35 MULTIPLE SCLEROSIS (MULTI): ICD-10-CM

## 2025-02-24 DIAGNOSIS — Z12.11 ENCOUNTER FOR COLORECTAL CANCER SCREENING: ICD-10-CM

## 2025-02-24 DIAGNOSIS — Z00.00 ENCOUNTER FOR MEDICARE ANNUAL WELLNESS EXAM: Primary | ICD-10-CM

## 2025-02-24 DIAGNOSIS — I10 HYPERTENSION, UNSPECIFIED TYPE: ICD-10-CM

## 2025-02-24 DIAGNOSIS — E55.9 VITAMIN D DEFICIENCY: ICD-10-CM

## 2025-02-24 DIAGNOSIS — E78.00 HIGH CHOLESTEROL: ICD-10-CM

## 2025-02-24 DIAGNOSIS — Z53.20 MAMMOGRAM DECLINED: ICD-10-CM

## 2025-02-24 DIAGNOSIS — J45.20 MILD INTERMITTENT ASTHMA WITHOUT COMPLICATION (HHS-HCC): ICD-10-CM

## 2025-02-24 DIAGNOSIS — Z12.12 ENCOUNTER FOR COLORECTAL CANCER SCREENING: ICD-10-CM

## 2025-02-24 DIAGNOSIS — F32.A DEPRESSIVE DISORDER: ICD-10-CM

## 2025-02-24 DIAGNOSIS — G82.20 PARAPARESIS (MULTI): ICD-10-CM

## 2025-02-24 DIAGNOSIS — C18.2 MALIGNANT NEOPLASM OF ASCENDING COLON (MULTI): ICD-10-CM

## 2025-02-24 DIAGNOSIS — C20 RECTAL CANCER (MULTI): ICD-10-CM

## 2025-02-24 DIAGNOSIS — R23.8 DRY SCALP: ICD-10-CM

## 2025-02-24 PROBLEM — D72.9 ABNORMAL WHITE BLOOD CELL (WBC) COUNT: Status: RESOLVED | Noted: 2025-02-24 | Resolved: 2025-02-24

## 2025-02-24 PROBLEM — L98.9 DISORDER OF SCALP: Status: ACTIVE | Noted: 2023-10-09

## 2025-02-24 PROBLEM — R26.9 ABNORMAL GAIT: Status: ACTIVE | Noted: 2023-10-09

## 2025-02-24 PROBLEM — F43.21 GRIEF: Status: RESOLVED | Noted: 2025-02-24 | Resolved: 2025-02-24

## 2025-02-24 PROBLEM — R92.30 DENSE BREASTS, UNSPECIFIED: Status: RESOLVED | Noted: 2024-02-05 | Resolved: 2025-02-24

## 2025-02-24 PROCEDURE — 3079F DIAST BP 80-89 MM HG: CPT

## 2025-02-24 PROCEDURE — 3008F BODY MASS INDEX DOCD: CPT

## 2025-02-24 PROCEDURE — G0439 PPPS, SUBSEQ VISIT: HCPCS

## 2025-02-24 PROCEDURE — 99396 PREV VISIT EST AGE 40-64: CPT

## 2025-02-24 PROCEDURE — 3075F SYST BP GE 130 - 139MM HG: CPT

## 2025-02-24 RX ORDER — MULTIVITAMIN
1 TABLET ORAL
Status: CANCELLED | OUTPATIENT
Start: 2025-02-24

## 2025-02-24 RX ORDER — ERGOCALCIFEROL 1.25 MG/1
1 CAPSULE ORAL
Qty: 12 CAPSULE | Refills: 0 | Status: SHIPPED | OUTPATIENT
Start: 2025-02-24

## 2025-02-24 RX ORDER — ATORVASTATIN CALCIUM 40 MG/1
40 TABLET, FILM COATED ORAL NIGHTLY
Qty: 100 TABLET | Refills: 3 | Status: SHIPPED | OUTPATIENT
Start: 2025-02-24

## 2025-02-24 RX ORDER — KETOCONAZOLE 20 MG/ML
SHAMPOO, SUSPENSION TOPICAL 2 TIMES WEEKLY
Qty: 120 ML | Refills: 0 | Status: SHIPPED | OUTPATIENT
Start: 2025-02-24

## 2025-02-24 RX ORDER — ALBUTEROL SULFATE 90 UG/1
2 INHALANT RESPIRATORY (INHALATION) EVERY 4 HOURS PRN
Qty: 51 G | Refills: 2 | Status: SHIPPED | OUTPATIENT
Start: 2025-02-24

## 2025-02-24 RX ORDER — LYSINE HCL 500 MG
TABLET ORAL
Status: CANCELLED | OUTPATIENT
Start: 2025-02-24

## 2025-02-24 RX ORDER — AMLODIPINE BESYLATE 5 MG/1
5 TABLET ORAL DAILY
Qty: 100 TABLET | Refills: 3 | Status: SHIPPED | OUTPATIENT
Start: 2025-02-24

## 2025-02-24 ASSESSMENT — ENCOUNTER SYMPTOMS
LOSS OF SENSATION IN FEET: 0
OCCASIONAL FEELINGS OF UNSTEADINESS: 0
DEPRESSION: 0

## 2025-02-24 ASSESSMENT — ACTIVITIES OF DAILY LIVING (ADL)
DOING_HOUSEWORK: INDEPENDENT
GROCERY_SHOPPING: INDEPENDENT
DRESSING: INDEPENDENT
TAKING_MEDICATION: INDEPENDENT
BATHING: INDEPENDENT
MANAGING_FINANCES: INDEPENDENT

## 2025-02-24 ASSESSMENT — PATIENT HEALTH QUESTIONNAIRE - PHQ9
2. FEELING DOWN, DEPRESSED OR HOPELESS: NOT AT ALL
SUM OF ALL RESPONSES TO PHQ9 QUESTIONS 1 AND 2: 0
2. FEELING DOWN, DEPRESSED OR HOPELESS: NOT AT ALL
SUM OF ALL RESPONSES TO PHQ9 QUESTIONS 1 AND 2: 0
1. LITTLE INTEREST OR PLEASURE IN DOING THINGS: NOT AT ALL
1. LITTLE INTEREST OR PLEASURE IN DOING THINGS: NOT AT ALL

## 2025-02-24 NOTE — PROGRESS NOTES
Primary Care Provider: RITA Sherwood-SILVIA    Chief Complaint: Medicare Wellness Exam/Comprehensive Problem Focused Follow Up and Physical Exam    HPI:    MWE & CPE    Healthcare POA-sister; Josefina Ceja- encouraged her to complete her paperwork; [phone]; need copy of her completed paperwork; full code     Colonoscopy declines, but is okay with cologaurd testing  Hx of right colon cancer  CT scans in 5/22/2022  no evidence of recurrence   Last colonoscopy 12/10/2019  Hx of rectal cancer and colon cancer    mammogram due- declines/ refuses    Refuses immunizations    Depression and chronic back pain  On Cymblata 30mg doing well on this    Asthma- no s/s    Multiple sclerosis- needs to get back in with her MS provider however symptoms have been stable    Paraparesis  Stable    HTN    HLD    Vit d def    Dry flaky scalp          Active Problem List  Patient Active Problem List   Diagnosis    Anemia    Arthritis    Asthma    Back pain    Acute bilateral low back pain without sciatica    Chronic bilateral low back pain with sciatica    Neck pain, chronic    Chronic pain of both shoulders    Hypertension    High cholesterol    Abnormal gait    Irregular menstrual cycle    Low serum vitamin B12    Malignant neoplasm of ascending colon (Multi)    Malignant neoplasm of rectum (Multi)    Multiple sclerosis (Multi)    Myopia    Paraparesis (Multi)    Presbyopia    History of colectomy    Spasticity    Vitamin D deficiency    Weakness    Mammogram declined    At high risk for falls    Swelling of finger    Pain of finger    Fall    Disorder of scalp    Influenza vaccination declined by patient    Tinea capitis    History of scarlatina    History of anemia    Depressive disorder    Cervical dysplasia    Dense breast tissue    Routine general medical examination at a health care facility    Advance directive discussed with patient       Comprehensive Medical/Surgical/Social/Family History  Past Medical History:    Diagnosis Date    Abnormal white blood cell (WBC) count 02/24/2025    Constipation 12/06/2012    Contusion of left shoulder, initial encounter 02/08/2019    Contusion of left shoulder, initial encounter    Dense breasts, unspecified 02/05/2024    Dizziness 03/15/2016    Encounter for blood-alcohol and blood-drug test 03/21/2019    Encounter for drug screening    Encounter for general adult medical examination without abnormal findings 08/03/2021    Medicare annual wellness visit, subsequent    Grief 02/24/2025    History of visual disturbance 02/05/2024    Inconclusive mammogram 03/27/2019    Dense breast tissue    Injury of left foot 02/05/2024    Low back pain, unspecified     Acute bilateral low back pain without sciatica    Lumbago with sciatica, unspecified side 04/06/2020    Chronic bilateral low back pain with sciatica    Nausea 03/15/2016    Pain in left shoulder 10/07/2019    Left shoulder pain    Pain in right shoulder 11/22/2019    Chronic pain of both shoulders    Pain in right shoulder 05/31/2019    Shoulder pain, bilateral    Personal history of cervical dysplasia 03/27/2019    History of cervical dysplasia    Personal history of diseases of the blood and blood-forming organs and certain disorders involving the immune mechanism     History of anemia    Personal history of other diseases of the digestive system     H/O constipation    Personal history of other diseases of the musculoskeletal system and connective tissue 11/22/2019    History of low back pain    Personal history of other diseases of the musculoskeletal system and connective tissue 11/14/2019    History of neck pain    Personal history of other diseases of the musculoskeletal system and connective tissue 11/22/2019    History of neck pain    Personal history of other diseases of the nervous system and sense organs     History of myopia    Personal history of other endocrine, nutritional and metabolic disease 07/14/2021    History of  elevated lipids    Personal history of other endocrine, nutritional and metabolic disease 08/19/2020    History of vitamin D deficiency    Personal history of other infectious and parasitic diseases     History of scarlet fever    Personal history of other infectious and parasitic diseases 04/06/2020    History of tinea capitis    Personal history of other infectious and parasitic diseases 04/15/2020    History of athlete's foot    Personal history of other malignant neoplasm of large intestine     History of malignant neoplasm of colon    Personal history of other mental and behavioral disorders 01/08/2021    History of depression    Personal history of other specified conditions     History of abdominal pain    Personal history of other specified conditions     History of dizziness    Personal history of other specified conditions     H/O nausea    Personal history of other specified conditions 02/26/2020    History of abnormal mammogram    Reaction to severe stress, unspecified 09/26/2019    Situational stress    Seborrhea capitis 08/19/2020    Dandruff, adult    Unspecified asthma with (acute) exacerbation ([facility]) 04/06/2020    Acute asthma exacerbation    Unspecified injury of left foot, initial encounter 04/15/2020    Foot injury, left, initial encounter     Past Surgical History:   Procedure Laterality Date    OTHER SURGICAL HISTORY  02/08/2019    Colectomy    OTHER SURGICAL HISTORY  02/08/2019    Colonoscopy    OTHER SURGICAL HISTORY  02/08/2019    Hysterectomy     Social History     Tobacco Use    Smoking status: Never    Smokeless tobacco: Never   Substance Use Topics    Alcohol use: Never    Drug use: Never     No family history on file.      Allergies and Medications  Aloe vera; Metronidazole; Sulfa (sulfonamide antibiotics); Tetracycline; Aspirin,buffd-calcium carb-mag; Docusate; Docusate sodium; Influenza virus vaccines; Oseltamivir; Statins-hmg-coa reductase inhibitors;  Sulfamethoxazole-trimethoprim; Theraflu day-night cold,cough; and Soap  Current Outpatient Medications on File Prior to Visit   Medication Sig Dispense Refill    acetaminophen (Tylenol) 325 mg tablet Take 2 tablets (650 mg) by mouth every 6 hours if needed for moderate pain (4 - 6).      amantadine (Symmetrel) 100 mg tablet Take by mouth.      baclofen (Lioresal) 10 mg tablet Take 1 tablet (10 mg) by mouth 3 times a day. 90 tablet 3    calcium carbonate-vit D3-min 600 mg calcium- 400 unit tablet Take by mouth.      DULoxetine (Cymbalta) 30 mg DR capsule TAKE 1 CAPSULE BY MOUTH ONCE  DAILY DO NOT CRUSH OR CHEW 30 capsule 11    multivitamin tablet Take 1 tablet by mouth once daily.      sodium chloride (Ocean) 0.65 % nasal spray Administer 2 sprays into affected nostril(s).      [DISCONTINUED] albuterol 90 mcg/actuation inhaler INHALE 2 INHALATIONS BY MOUTH  EVERY 4 HOURS IF NEEDED FOR  WHEEZING 51 g 2    [DISCONTINUED] amLODIPine (Norvasc) 5 mg tablet TAKE 1 TABLET BY MOUTH ONCE  DAILY 100 tablet 2    [DISCONTINUED] atorvastatin (Lipitor) 40 mg tablet TAKE 1 TABLET BY MOUTH ONCE  DAILY AT BEDTIME 100 tablet 2    [DISCONTINUED] ergocalciferol (Vitamin D2) 1.25 MG (14895 UT) capsule Take 1 capsule (1,250 mcg) by mouth 1 (one) time per week. 12 capsule 0    [DISCONTINUED] ketoconazole (NIZOral) 2 % shampoo APPLY 5 TO 10 ML TOPICALLY AND  SHAMPOO ONCE WEEKLY. LEAVE ON  FOR 5 TO 10 MINUTES THEN RINSE 120 mL 0    blood pressure test kit-medium kit 1 kit once daily. 1 kit 0    Mayzent Starter,for 2mg maint, 0.25 mg (12 tabs) tablets,dose pack tablet dose pack TAKE BY MOUTH AS DIRECTED PER  PACKAGE THEN AS DIRECTED  THEREAFTER (Patient not taking: Reported on 2/24/2025) 12 tablet 0    thiamine 100 mg tablet Take by mouth.       No current facility-administered medications on file prior to visit.       Medications and Supplements  prescribed by me and other practitioners or clinical pharmacist (such as prescriptions, OTC's,  "herbal therapies and supplements) were reviewed and documented in the medical record.    Tobacco/Alcohol/Opioid use, as well as Illicit Drug Use was screened for/reviewed and documented in Social History section and medication list as appropriate    Advance directives  Advanced Care Planning (including a Living Will, Healthcare POA, as well as specific end of life choices and/or directives), was discussed  with the patient and/or surrogate, voluntarily, and documented in the medical record.       ROS otherwise negative aside from what was mentioned above in HPI.    Vitals  /82   Pulse 88   Ht 1.549 m (5' 1\")   Wt 72.1 kg (159 lb)   SpO2 95%   BMI 30.04 kg/m²   Body mass index is 30.04 kg/m².  Physical Exam  Gen: Alert, NAD  HEENT:  PERRLA, EOMI, conjunctiva and sclera normal in appearance. External auditory canals/TMs normal; Oral cavity and posterior pharynx without lesions/exudate  Neck:  Supple with FROM; No masses/nodes palpable; Thyroid nontender and without nodules; No WILDA  Respiratory:  Lungs CTAB  Cardiovascular:  Heart RRR. No M/R/G. Peripheral pulses equal bilaterally  Abdomen:  Soft, nontender, BS present throughout; No R/G/R; No HSM or masses palpated  Extremities:  FROM all extremities; Muscle strength grossly normal with good tone  Neuro:  CN II-XII intact; Reflexes 2+/2+; Gross motor and sensory intact  Skin:  warm, dry    Assessment and Plan:  Problem List Items Addressed This Visit    MWE & CPE    Healthcare POA-sister; Josefina Ceja- encouraged her to complete her paperwork; [phone]; need copy of her completed paperwork; full code     Colonoscopy declines, but is okay with cologaurd testing  Hx of right colon cancer  CT scans in 5/22/2022  no evidence of recurrence   Last colonoscopy 12/10/2019  Hx of rectal cancer and colon cancer    mammogram due- declines/ refuses    Refuses immunizations     Asthma  Stable, has not needed as needed albuterol inhaler really    Hypertension    " Stable  Relevant Medications    amLODIPine (Norvasc) 5 mg tablet    High cholesterol    Stable  Relevant Medications    atorvastatin (Lipitor) 40 mg tablet    Other Relevant Orders    Lipid Panel    Malignant neoplasm of ascending colon (Multi)    Overview     >>OVERVIEW FOR MALIGNANT NEOPLASM OF COLON (MULTI) WRITTEN ON 10/9/2023  1:48 PM BY LIU HERNDON    Colonoscopy declines, but is okay with cologaurd testing  Hx of right colon cancer  CT scans in 5/22/2022  no evidence of recurrence   Last colonoscopy 12/10/2019  Hx of rectal cancer and colon cancer         Malignant neoplasm of rectum (Multi)    Overview     Colonoscopy declines, but is okay with cologaurd testing  Hx of right colon cancer  CT scans in 5/22/2022  no evidence of recurrence   Last colonoscopy 12/10/2019  Hx of rectal cancer and colon cancer         Multiple sclerosis (Multi)  needs to get back in with her MS provider however symptoms have been stable    Paraparesis (Multi)  needs to get back in with her MS provider however symptoms have been stable    Vitamin D deficiency    Relevant Medications    ergocalciferol (Vitamin D2) 1250 mcg (50,000 units) capsule    Other Relevant Orders    Vitamin D 25-Hydroxy,Total (for eval of Vitamin D levels)    Mammogram declined    Disorder of scalp    Relevant Medications    ketoconazole (NIZOral) 2 % shampoo    Depressive disorder  Stable, continue with Cymbalta     Routine general medical examination at a health care facility - Primary    Relevant Medications    albuterol 90 mcg/actuation inhaler     Other Visit Diagnoses       Encounter for colorectal cancer screening        Relevant Orders    Cologuard® colon cancer screening                During the course of the visit the patient was educated and counseled about age appropriate screening and preventive services. Completed preventive screenings were documented in the chart and orders were placed for outstanding screenings/procedures as  documented in the Assessment and Plan.      Patient Instructions (the written plan) was given to the patient at check out.    Follow-up in 6 months or sooner if needed    RITA Sherwood-CNP

## 2025-02-27 ENCOUNTER — TELEPHONE (OUTPATIENT)
Dept: PRIMARY CARE | Facility: CLINIC | Age: 60
End: 2025-02-27
Payer: COMMERCIAL

## 2025-03-06 ENCOUNTER — APPOINTMENT (OUTPATIENT)
Dept: NEUROLOGY | Facility: HOSPITAL | Age: 60
End: 2025-03-06
Payer: COMMERCIAL

## 2025-03-10 ENCOUNTER — APPOINTMENT (OUTPATIENT)
Dept: PRIMARY CARE | Facility: CLINIC | Age: 60
End: 2025-03-10
Payer: COMMERCIAL

## 2025-03-11 LAB — NONINV COLON CA DNA+OCC BLD SCRN STL QL: NEGATIVE

## 2025-03-14 ENCOUNTER — TELEPHONE (OUTPATIENT)
Dept: PRIMARY CARE | Facility: CLINIC | Age: 60
End: 2025-03-14
Payer: COMMERCIAL

## 2025-03-14 NOTE — TELEPHONE ENCOUNTER
Per Tatiana Nava, APRN-CNP... Informed Patient Normal cologaurd testing for colorectal cancer screen. Repeat in 3 years.

## 2025-03-25 ENCOUNTER — APPOINTMENT (OUTPATIENT)
Dept: NEUROLOGY | Facility: HOSPITAL | Age: 60
End: 2025-03-25
Payer: COMMERCIAL

## 2025-04-15 ENCOUNTER — APPOINTMENT (OUTPATIENT)
Dept: NEUROLOGY | Facility: HOSPITAL | Age: 60
End: 2025-04-15
Payer: COMMERCIAL

## 2025-04-17 ENCOUNTER — APPOINTMENT (OUTPATIENT)
Dept: NEUROLOGY | Facility: HOSPITAL | Age: 60
End: 2025-04-17
Payer: COMMERCIAL

## 2025-04-28 ENCOUNTER — OFFICE VISIT (OUTPATIENT)
Dept: NEUROLOGY | Facility: HOSPITAL | Age: 60
End: 2025-04-28
Payer: COMMERCIAL

## 2025-04-28 VITALS
HEIGHT: 61 IN | HEART RATE: 70 BPM | SYSTOLIC BLOOD PRESSURE: 148 MMHG | WEIGHT: 181 LBS | RESPIRATION RATE: 18 BRPM | TEMPERATURE: 97.1 F | DIASTOLIC BLOOD PRESSURE: 77 MMHG | BODY MASS INDEX: 34.17 KG/M2

## 2025-04-28 DIAGNOSIS — R26.9 IMPAIRED GAIT: Primary | ICD-10-CM

## 2025-04-28 PROCEDURE — 3008F BODY MASS INDEX DOCD: CPT | Performed by: PSYCHIATRY & NEUROLOGY

## 2025-04-28 PROCEDURE — 99205 OFFICE O/P NEW HI 60 MIN: CPT | Performed by: PSYCHIATRY & NEUROLOGY

## 2025-04-28 PROCEDURE — 3078F DIAST BP <80 MM HG: CPT | Performed by: PSYCHIATRY & NEUROLOGY

## 2025-04-28 PROCEDURE — 99215 OFFICE O/P EST HI 40 MIN: CPT | Performed by: PSYCHIATRY & NEUROLOGY

## 2025-04-28 PROCEDURE — 3077F SYST BP >= 140 MM HG: CPT | Performed by: PSYCHIATRY & NEUROLOGY

## 2025-04-28 ASSESSMENT — PAIN SCALES - GENERAL: PAINLEVEL_OUTOF10: 6

## 2025-04-28 NOTE — PATIENT INSTRUCTIONS
You have been diagnosed with MS in the past in the setting of intermittent gait problems.     I can't see any of your previous MRIs, which are necessary to confirm the MS diagnosis. I will order MRI of your brain and spinal cord to evaluate the diagnosis of MS and to see if the disease is still active.     Please make sure you take at least 4000 units of vitamin D everyday.     Follow up after one to two months to discuss the MRIs.     Thank you for visiting the clinic today    Alok Jc MD

## 2025-04-28 NOTE — PROGRESS NOTES
Subjective     Roberta Dewey is a 59 y.o. right handed female with hx of colon cancer s/p surgery radio/chemo in 2013, HTN, HPL, asthma, and family hx of MS (paternal cousin) who is here to establish care for known MS. Previously followed with Dr. Page.       History of Present Illness   [facility] [place] NEURO IMMUNOLOGY HPI: Date of Onset: unclear, possibly 2011  Date of Diagnosis: 2016  Last MRI Brain: 2017  Last MRI Cervical: 2017  Last MRI Thoracis: 2017  Last OCT/VEP: not done  Disease Course at Onset: progressive relapsing?  Disease Course Now: progressive relapsing?  Current Disease Modifying Therapies: none  Previous Disease Modifying Therapies: DMF from 2019 to 2023 the stopped because of perceived lack of benefit. Then took mayzent from 2023 to 2024 then stopped because of perceived lack of benefit  Cerebrospinal Fluid: done it in 2016 and was reportedly consistent with MS  Cuco Hale Virus: not done  Varicella Zoster Virus: not done  Hep Panel: negative 2020  Neuromyelitis Optica: not done  Myelin Oligodendrocyte Glycoprotein: not done    HPI  Patient is a poor historian so most of the hx is derived from epic. She says that she had difficulty walking when she was 4 and her doctor wanted her to be checked for neurological disease but her parents did not follow through. She recalls that her PCP commented about her abnormal gait when she was in her twenties. She always had periods when her walking was normal and other periods when her walking was abnormal. She was suspected to have MS most of her life. Starting 2011, her family noticed gradual decline in walking ability as well as memory. In 2016 she started losing weight and started having severe nausea and dizziness. She started having progressive generalized weakness until one day she could not walk at all. She was admitted to [facility] and was found to have colon cancer during that admission that was treated with surgery, chemotherapy, and  radiotherapy. She was also seen by neurology during that admission and MRIs incidentally showed demyelinating lesions in the brain and cervical spinal cord per report. She was diagnosed with MS was treated with solumedrol that led to subjective clinical improvement. She then for a while at the [facility] with Dr. Black and Dr. Caban. The decision was made at that time not to start DMT given her recent cancer diagnosis. She was given empiric steroids in 2017 that made her stronger. She started seeing Dr. Page in 2019 and was complaining of continued progression of her walking difficulty. He subsequently started her on DMF, which she took for two year then stopped it because of diarrhea and lack of perceived benefits. She took mayzent after that but stopped about six months ago again due to lack of perceived benefits. No rebound after stopping mayzent.        MS Symptom Review: Weakness: yes one event in 2013 where she couldn't walk  Sensory Changes: leg tingling  Incoordination: No incoordination   Falling: No falling  Gait Change: yes presenting symptom gradual initially and then hand acute worsening in 2013  Painful Vision Loss: No painful vision loss  Double Vision: No double vision   Vertigo: No vertigo  Facial/Bulvar Weakness: No facial/bulvar weakness  Bladder/Bowel Dysfunction: No bladder/bowel dysfunction   Spasticity: legs feel stiff  Tonic Spasms: isometric only in the legs  Tremors: No tremors  Restless Leg Syndrome: No restless leg syndrome   Dystonia: No dystonia  Other Movement Disorders: No other movement disorders  Heat Sensitivity: No heat sensitivity  Fatigue: No fatigue  Depression: No depression   Anxiety: No anxiety  Cognitive Changes: minor short term issues  Disease Modifying Therapies: No Disease modifying therapies   Side Effects: No side effects  Is the patient taking Vitamin D supplements? Yes but not sure of the dose  Is the patient taking Symptomatic medications? Baclofen 10  "mg three times a day helpful, cymbalta 30 mg daily helpful, amantadine for fatigue, helpful.        Surgical History[1]  Social History     Tobacco Use    Smoking status: Never    Smokeless tobacco: Never   Substance Use Topics    Alcohol use: Never     Allergies[2]  Visit Vitals  /77   Pulse 70   Temp 36.2 °C (97.1 °F)   Resp 18   Ht 1.549 m (5' 1\")   Wt 82.1 kg (181 lb)   BMI 34.20 kg/m²   Smoking Status Never   BSA 1.88 m²       Objective   Neurological Exam  Mental Status  Awake, alert and oriented to person, place and time. Recent and remote memory are intact. Speech is normal. Language is fluent with no aphasia. Attention and concentration are normal.    Cranial Nerves  CN II: Visual fields full to confrontation.  CN V: Facial sensation is normal.  CN VII: Full and symmetric facial movement.  CN VIII: Hearing is normal.  CN IX, X: Palate elevates symmetrically  CN XII: Tongue midline without atrophy or fasciculations.  Impaired convergence. May be slight limitation of right eye abduction but no clear TERRI.    Motor  Normal muscle bulk throughout. Normal muscle tone. No abnormal involuntary movements. Strength is 5/5 throughout all four extremities.    Sensory  Distal reduction of vibration sense.    Reflexes                                            Right                      Left  Brachioradialis                    3+                         3+  Biceps                                 3+                         3+  Triceps                                3+                         3+  Patellar                                4+                         4+  Achilles                                2+                         2+    Right pathological reflexes: Celia's absent. Tromner absent. Suprapatellar present. Crossed adductor present. Ankle clonus absent.  Left pathological reflexes: Celia's absent. Tromner absent. Suprapatellar present. Crossed adductor present. Ankle clonus " absent.    Coordination  Right: Finger-to-nose normal. Rapid alternating movement normal.Left: Finger-to-nose normal. Rapid alternating movement normal.    Gait    Astasia abasia.    Physical Exam  Neurological:      Motor: Motor strength is normal.     Deep Tendon Reflexes:      Reflex Scores:       Tricep reflexes are 3+ on the right side and 3+ on the left side.       Bicep reflexes are 3+ on the right side and 3+ on the left side.       Brachioradialis reflexes are 3+ on the right side and 3+ on the left side.       Patellar reflexes are 4+ on the right side and 4+ on the left side.       Achilles reflexes are 2+ on the right side and 2+ on the left side.  Psychiatric:         Speech: Speech normal.   25-f-w: 7.7 s, unassisted, astasia abasia    CLINICAL SCORES  Scales and Scores:      Assessment/Plan     This is a 59 y.o. right handed AA female  With a PMH of colon cancer s/p surgery radio/chemo in 2016, HTN, HPL, asthma, and family hx of MS (paternal cousin) who presents with progressive decline in gait and cognition since 2011. In 2016, she lost the ability to walk in the setting of severe nausea/vomiting and generalized weakness related to her undiagnosed colon cancer at that time. The diagnosis of MS was made incidentally at that time based on positive MRI and CSF. She had some clinical improvement with empiric steroids.   The Neurological Exam showed diffuse pathological hyperreflexia an astasia abasia.   The MRI Showed demyelinating lesions in the brain and cervical spinal cord per report. .   Cerebrospinal Fluid was done in 2016 and was reportedly positive for elevated IGG index and unmatched OCBs.   OCT/VEP not done.   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 I'm unable to  radiological activity or disease progression currently but she may be in the inactive and non-progressive phase of the disease. The diagnosis needs MRI confirmation as I was  unable to review any of her previous MRIs.   Disease Modifying Therapies: unclear is indicated especially with her colon cancer hx. She previously took DMF and mayzent without major issues. She can be a candidate for ocrevus if she develops any clinical or radiological activity or if longitudinal testing confirmed ongoing disease progression.     Plan:  - Discussion: I discussed the need for MRI conformation of the diagnosis  - Acute relapse treatment: not indicated   - DMT: will monitor her off DMT for now. Low threshold to start ocrevus in case of any evidence of disease activity or progression.   - Symptomatic: continue baclofen, amantadine, and cymbalta.  - MRI: Will order MRI of the brain and spinal cord WWO now for monitoring.   - Blood work: Will send blood work for MS mimics and dormant infections prior to DMT initiation.    - make sure to take at least 4000 units of vitamin D daily  - PT/OT: deferred  - Smoking: non-smoker  - Wellness: discussed low salt and healthy dieting.   - Follow up: after 1-2 months to review test results and select DMT.     The impression and plan were discussed with the patient and family (if present) in details and all questions answered.        Alok Jc MD         Diagnoses and all orders for this visit:  Impaired gait  -     MR cervical spine w and wo IV contrast; Future  -     MR brain w and wo IV contrast; Future  -     MR thoracic spine w and wo IV contrast; Future    No orders of the defined types were placed in this encounter.               [1]   Past Surgical History:  Procedure Laterality Date    OTHER SURGICAL HISTORY  02/08/2019    Colectomy    OTHER SURGICAL HISTORY  02/08/2019    Colonoscopy    OTHER SURGICAL HISTORY  02/08/2019    Hysterectomy   [2]   Allergies  Allergen Reactions    Aloe Vera Rash    Metronidazole Diarrhea    Sulfa (Sulfonamide Antibiotics) Rash     Throat swells    Tetracycline Diarrhea    Aspirin,Buffd-Calcium Carb-Mag Other    Docusate  Other    Docusate Sodium Other    Influenza Virus Vaccines Unknown     Gets very sick afterwards    Oseltamivir Other    Statins-Hmg-Coa Reductase Inhibitors Unknown    Sulfamethoxazole-Trimethoprim Other    Theraflu Day-Night Cold,Cough Other    Soap Rash     [facility] spring soap

## 2025-05-13 ENCOUNTER — APPOINTMENT (OUTPATIENT)
Dept: NEUROLOGY | Facility: HOSPITAL | Age: 60
End: 2025-05-13
Payer: COMMERCIAL
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
## 2025-07-31 ENCOUNTER — APPOINTMENT (OUTPATIENT)
Dept: UROLOGY | Facility: CLINIC | Age: 60
End: 2025-07-31
Payer: COMMERCIAL

## 2025-07-31 VITALS — TEMPERATURE: 97.1 F

## 2025-07-31 DIAGNOSIS — R10.2 PELVIC PAIN: Primary | ICD-10-CM

## 2025-07-31 LAB
POC APPEARANCE, URINE: CLEAR
POC BILIRUBIN, URINE: NEGATIVE
POC BLOOD, URINE: NEGATIVE
POC COLOR, URINE: YELLOW
POC GLUCOSE, URINE: NEGATIVE MG/DL
POC KETONES, URINE: NEGATIVE MG/DL
POC LEUKOCYTES, URINE: ABNORMAL
POC NITRITE,URINE: NEGATIVE
POC PH, URINE: 5.5 PH
POC PROTEIN, URINE: ABNORMAL MG/DL
POC SPECIFIC GRAVITY, URINE: 1.02
POC UROBILINOGEN, URINE: 0.2 EU/DL

## 2025-07-31 PROCEDURE — 81002 URINALYSIS NONAUTO W/O SCOPE: CPT | Performed by: OBSTETRICS & GYNECOLOGY

## 2025-07-31 PROCEDURE — 1036F TOBACCO NON-USER: CPT | Performed by: OBSTETRICS & GYNECOLOGY

## 2025-07-31 PROCEDURE — 99204 OFFICE O/P NEW MOD 45 MIN: CPT | Performed by: OBSTETRICS & GYNECOLOGY

## 2025-07-31 PROCEDURE — G2211 COMPLEX E/M VISIT ADD ON: HCPCS | Performed by: OBSTETRICS & GYNECOLOGY

## 2025-07-31 RX ORDER — DIAZEPAM 5 MG/1
5 TABLET ORAL EVERY 8 HOURS PRN
Qty: 10 TABLET | Refills: 0 | Status: SHIPPED | OUTPATIENT
Start: 2025-07-31 | End: 2025-08-07

## 2025-07-31 ASSESSMENT — PAIN SCALES - GENERAL: PAINLEVEL_OUTOF10: 4

## 2025-08-06 ENCOUNTER — PATIENT OUTREACH (OUTPATIENT)
Dept: CARE COORDINATION | Facility: CLINIC | Age: 60
End: 2025-08-06
Payer: COMMERCIAL

## 2025-08-06 DIAGNOSIS — Z12.31 ENCOUNTER FOR SCREENING MAMMOGRAM FOR BREAST CANCER: ICD-10-CM

## 2025-08-11 DIAGNOSIS — R10.2 PELVIC PAIN: ICD-10-CM

## 2025-08-21 ENCOUNTER — HOSPITAL ENCOUNTER (OUTPATIENT)
Dept: RADIOLOGY | Facility: CLINIC | Age: 60
Discharge: HOME | End: 2025-08-21
Payer: COMMERCIAL

## 2025-08-21 DIAGNOSIS — R26.9 IMPAIRED GAIT: ICD-10-CM

## 2025-08-21 PROCEDURE — 72157 MRI CHEST SPINE W/O & W/DYE: CPT

## 2025-08-21 PROCEDURE — 72157 MRI CHEST SPINE W/O & W/DYE: CPT | Performed by: RADIOLOGY

## 2025-08-21 PROCEDURE — A9575 INJ GADOTERATE MEGLUMI 0.1ML: HCPCS | Performed by: PSYCHIATRY & NEUROLOGY

## 2025-08-21 PROCEDURE — 70553 MRI BRAIN STEM W/O & W/DYE: CPT | Performed by: RADIOLOGY

## 2025-08-21 PROCEDURE — 70553 MRI BRAIN STEM W/O & W/DYE: CPT

## 2025-08-21 PROCEDURE — 72156 MRI NECK SPINE W/O & W/DYE: CPT

## 2025-08-21 PROCEDURE — 72156 MRI NECK SPINE W/O & W/DYE: CPT | Performed by: RADIOLOGY

## 2025-08-21 PROCEDURE — 2550000001 HC RX 255 CONTRASTS: Performed by: PSYCHIATRY & NEUROLOGY

## 2025-08-21 RX ORDER — GADOTERATE MEGLUMINE 376.9 MG/ML
17 INJECTION INTRAVENOUS
Status: COMPLETED | OUTPATIENT
Start: 2025-08-21 | End: 2025-08-21

## 2025-08-21 RX ADMIN — GADOTERATE MEGLUMINE 17 ML: 376.9 INJECTION INTRAVENOUS at 11:50

## 2025-08-25 ENCOUNTER — TELEPHONE (OUTPATIENT)
Dept: UROLOGY | Facility: CLINIC | Age: 60
End: 2025-08-25
Payer: COMMERCIAL

## 2025-08-25 DIAGNOSIS — R10.2 PELVIC PAIN: ICD-10-CM

## 2025-08-25 DIAGNOSIS — R10.2 PELVIC PAIN: Primary | ICD-10-CM

## 2025-08-25 RX ORDER — DIAZEPAM 5 MG/1
5 TABLET ORAL EVERY 8 HOURS PRN
Qty: 10 TABLET | Refills: 0 | Status: SHIPPED | OUTPATIENT
Start: 2025-08-25 | End: 2025-08-25

## 2025-08-25 RX ORDER — DIAZEPAM 5 MG/1
5 TABLET ORAL EVERY 8 HOURS PRN
Qty: 10 TABLET | Refills: 0 | Status: SHIPPED | OUTPATIENT
Start: 2025-08-25 | End: 2025-09-01

## 2025-11-20 ENCOUNTER — APPOINTMENT (OUTPATIENT)
Dept: UROLOGY | Facility: CLINIC | Age: 60
End: 2025-11-20
Payer: COMMERCIAL